# Patient Record
Sex: FEMALE | Race: WHITE | Employment: PART TIME | URBAN - METROPOLITAN AREA
[De-identification: names, ages, dates, MRNs, and addresses within clinical notes are randomized per-mention and may not be internally consistent; named-entity substitution may affect disease eponyms.]

---

## 2019-03-21 ENCOUNTER — OFFICE VISIT (OUTPATIENT)
Dept: OBGYN CLINIC | Age: 29
End: 2019-03-21

## 2019-03-21 VITALS — SYSTOLIC BLOOD PRESSURE: 120 MMHG | DIASTOLIC BLOOD PRESSURE: 78 MMHG | WEIGHT: 157.13 LBS

## 2019-03-21 DIAGNOSIS — Z01.419 ENCOUNTER FOR GYNECOLOGICAL EXAMINATION WITHOUT ABNORMAL FINDING: Primary | ICD-10-CM

## 2019-03-21 RX ORDER — AMOXICILLIN AND CLAVULANATE POTASSIUM 500; 125 MG/1; MG/1
TABLET, FILM COATED ORAL
COMMUNITY
Start: 2019-03-20 | End: 2019-10-30

## 2019-03-21 NOTE — PATIENT INSTRUCTIONS

## 2019-03-21 NOTE — PROGRESS NOTES
Annual exam ages 21-44    Taylor Ramos is a ,  30 y.o. female [facility] Patient's last menstrual period was 2019 (exact date). .    She presents for her annual checkup. She is having no significant problems. Son now 14m old; living in [place]; just recently moved to [place]; uncomplicated birth  Menstrual status:    Her periods are moderate in flow. She is using three to five pads or tampons per day, usually regular and last 26-30 days. She denies dysmenorrhea. She reports no premenstrual symptoms. Contraception:    The current method of family planning is none. Sexual history:     She  reports that she currently engages in sexual activity and has had partners who are Male. She reports using the following method of birth control/protection: None. .    Medical conditions:    Since her last annual GYN exam about two years ago, she has not the following changes in her health history: none. Pap and Mammogram History:    Her most recent Pap smear was normal, obtained 2 year(s) ago. Breast Cancer History/Substance Abuse: negative    Past Medical History:   Diagnosis Date    Gestational diabetes      History reviewed. No pertinent surgical history. Current Outpatient Medications   Medication Sig Dispense Refill    amoxicillin-clavulanate (AUGMENTIN) 500-125 mg per tablet        Allergies: Patient has no allergy information on record. Tobacco History:  reports that she has never smoked. She has never used smokeless tobacco.  Alcohol Abuse:  reports that she drank alcohol. Drug Abuse:  reports that she does not use drugs.     Family Medical/Cancer History:   Family History   Problem Relation Age of Onset    Hypertension Mother     Breast Cancer Paternal Aunt         Review of Systems - History obtained from the patient  Constitutional: negative for weight loss, fever, night sweats  HEENT: negative for hearing loss, earache, congestion, snoring, sorethroat  CV: negative for chest pain, palpitations, edema  Resp: negative for cough, shortness of breath, wheezing  GI: negative for change in bowel habits, abdominal pain, black or bloody stools  : negative for frequency, dysuria, hematuria, vaginal discharge  MSK: negative for back pain, joint pain, muscle pain  Breast: negative for breast lumps, nipple discharge, galactorrhea  Skin :negative for itching, rash, hives  Neuro: negative for dizziness, headache, confusion, weakness  Psych: negative for anxiety, depression, change in mood  Heme/lymph: negative for bleeding, bruising, pallor    Physical Exam    Visit Vitals  /78 (BP 1 Location: Left arm, BP Patient Position: Sitting)   Wt 157 lb 2 oz (71.3 kg)   LMP 03/05/2019 (Exact Date)       Constitutional  · Appearance: well-nourished, well developed, alert, in no acute distress    HENT  · Head and Face: appears normal    Chest  · Respiratory Effort: breathing unlabored    Breasts  · Inspection of Breasts: breasts symmetrical, no skin changes, no discharge present, nipple appearance normal, no skin retraction present  · Palpation of Breasts and Axillae: no masses present on palpation, no breast tenderness  · Axillary Lymph Nodes: no lymphadenopathy present    Gastrointestinal  · Abdominal Examination: abdomen non-tender to palpation, normal bowel sounds, no masses present  · Liver and spleen: no hepatomegaly present, spleen not palpable  · Hernias: no hernias identified    Genitourinary  · External Genitalia: normal appearance for age, no discharge present, no tenderness present, no inflammatory lesions present, no masses present, no atrophy present  · Vagina: normal vaginal vault without central or paravaginal defects, no discharge present, no inflammatory lesions present, no masses present  · Bladder: non-tender to palpation  · Urethra: appears normal  · Cervix: normal   · Uterus: normal size, shape and consistency  · Adnexa: no adnexal tenderness present, no adnexal masses present  · Perineum: perineum within normal limits, no evidence of trauma, no rashes or skin lesions present  · Anus: anus within normal limits, no hemorrhoids present  · Inguinal Lymph Nodes: no lymphadenopathy present    Skin  · General Inspection: no rash, no lesions identified    Neurologic/Psychiatric  · Mental Status:  · Orientation: grossly oriented to person, place and time  · Mood and Affect: mood normal, affect appropriate    . Assessment:  Routine gynecologic examination  Considering conception  Her current medical status is satisfactory with no evidence of significant gynecologic issues. Plan:  Preconceptual counseling reviewed and all questions answered.   GC/ chlamydia offered and declined  Pap done today  Counseled re: diet, exercise, healthy lifestyle  Return for yearly wellness visits  Gardisil counseling provided  Rec screening mammo at either 33 or 38

## 2019-03-25 LAB
CYTOLOGIST CVX/VAG CYTO: NORMAL
CYTOLOGY CVX/VAG DOC THIN PREP: NORMAL
DX ICD CODE: NORMAL
LABCORP, 190119: NORMAL
Lab: NORMAL
Lab: NORMAL
OTHER STN SPEC: NORMAL
PATH REPORT.FINAL DX SPEC: NORMAL
STAT OF ADQ CVX/VAG CYTO-IMP: NORMAL

## 2019-10-14 ENCOUNTER — TELEPHONE (OUTPATIENT)
Dept: OBGYN CLINIC | Age: 29
End: 2019-10-14

## 2019-10-14 RX ORDER — DOXYLAMINE SUCCINATE AND PYRIDOXINE HYDROCHLORIDE, DELAYED RELEASE TABLETS 10 MG/10 MG 10; 10 MG/1; MG/1
TABLET, DELAYED RELEASE ORAL
Qty: 120 TAB | Refills: 0 | Status: SHIPPED | OUTPATIENT
Start: 2019-10-14 | End: 2019-10-30

## 2019-10-14 NOTE — TELEPHONE ENCOUNTER
Patient calling stating that she is newly pregnant (roughly 8 weeks) and has started with morning sickness. In her last pregnancy, she was given a rx for diclegis and she is requesting this prescription now. She was given the OTC equivalence of diclegis just in case. Please advise if ok to send in rx?

## 2019-10-18 ENCOUNTER — TELEPHONE (OUTPATIENT)
Dept: OBGYN CLINIC | Age: 29
End: 2019-10-18

## 2019-10-18 RX ORDER — ONDANSETRON 8 MG/1
8 TABLET, ORALLY DISINTEGRATING ORAL
Qty: 30 TAB | Refills: 0 | Status: SHIPPED | OUTPATIENT
Start: 2019-10-18 | End: 2019-10-30 | Stop reason: SDUPTHER

## 2019-10-18 NOTE — TELEPHONE ENCOUNTER
Patient called, left ZAFAR on [facility] Triage line stating that the OTC equivalence to diclegis is not working for her nausea and she is too groggy when she takes the medication. She states she has been on zofran for her previous pregnancy and she is requesting the zofran again. EOB with US scheduled 10/30/2019    Please advise.

## 2019-10-18 NOTE — TELEPHONE ENCOUNTER
Per scheduled, AH is out of the office. Message forward to work-in MD.    Dr. Connor Costello please advise.

## 2019-10-30 ENCOUNTER — OFFICE VISIT (OUTPATIENT)
Dept: OBGYN CLINIC | Age: 29
End: 2019-10-30

## 2019-10-30 VITALS — WEIGHT: 154 LBS | HEIGHT: 64 IN | BODY MASS INDEX: 26.29 KG/M2

## 2019-10-30 DIAGNOSIS — Z34.80 PRENATAL CARE OF MULTIGRAVIDA, ANTEPARTUM: ICD-10-CM

## 2019-10-30 DIAGNOSIS — Z34.83 PRENATAL CARE, SUBSEQUENT PREGNANCY IN THIRD TRIMESTER: Primary | ICD-10-CM

## 2019-10-30 RX ORDER — ONDANSETRON 8 MG/1
8 TABLET, ORALLY DISINTEGRATING ORAL
Qty: 30 TAB | Refills: 0 | Status: SHIPPED | OUTPATIENT
Start: 2019-10-30 | End: 2019-11-15 | Stop reason: SDUPTHER

## 2019-10-30 RX ORDER — PYRIDOXINE HCL (VITAMIN B6) 25 MG
25 TABLET ORAL DAILY
COMMUNITY
End: 2020-06-05

## 2019-10-30 NOTE — PROGRESS NOTES
Current pregnancy history:    Ollie Grande is a  30 y.o. female [facility] Patient's last menstrual period was 2019. .  She presents for the evaluation of amenorrhea and a positive pregnancy test.    LMP history:  The date of her LMP is 3/53/59 certain. Her last menstrual period was normal.  A urine pregnancy test was positive      Based on her LMP, her EDC is 20 and her EGA is 8 weeks,5 days. Her menstrual cycles are regular and occur approximately every 28 days  and range from 3 to 5 days. Ultrasound data:  She had an  ultrasound done by the ultrasound tech today which revealed a viable dykes pregnancy with a gestational age of 10 weeks and 0 days giving an [address] of 6/3/20. Pregnancy symptoms:    Since her LMP she has experienced  urinary frequency, breast tenderness, and nausea. She has been vomiting over the last few weeks. Associated signs and symptoms which she denies: dysuria, discharge, vaginal bleeding. Relevant past pregnancy history:   She has the following pregnancy history: Her last pregnancy was not uncomplicated. Gestational diabetes   She has no history of  delivery. Relevant past medical history:(relevant to this pregnancy): noncontributory. Pap/Occupational history:  Last pap smear: this year Results: Normal          Substance history: negative for alcohol, tobacco and street drugs. Positive for nothing. Exposure history: There is/are no indoor cat/s in the home. The patient was instructed to not change the cat litter. She admits close contact with children on a regular basis. She has had chicken pox or the vaccine in the past.   Patient denies issues with domestic violence. Genetic Screening/Teratology Counseling: (Includes patient, baby's father, or anyone in either family with:)  3.  Patient's age >/= 28 at EDC?--no  2.   Thalassemia ([place], [place], [address], or  background): MCV<80?--no.     3.  Neural tube defect (meningomyelocele, spina bifida, anencephaly)?--no.   4.  Congenital heart defect?--no.  5.  Down syndrome?--no.   6.  Drake-Sachs ([place]ism, [place])?--no.   7.  Canavan's Disease?--no.   8.  Familial Dysautonomia?--no.   9.  Sickle cell disease or trait ()? --no   The patient has not been tested for sickle trait  10. Hemophilia or other blood disorders?--no. 11.  Muscular dystrophy?--no. 12.  Cystic fibrosis?--no. 13.  Wood's Chorea?--no. 14.  Mental retardation/autism (if yes was person tested for Fragile X)?--no. 15.  Other inherited genetic or chromosomal disorder?--no. 12.  Maternal metabolic disorder (DM, PKU, etc)?--no. 17.  Patient or FOB with a child with a birth defect not listed above?--no.  17a. Patient or FOB with a birth defect themselves?--no. 18.  Recurrent pregnancy loss, or stillbirth?--no. 19.  Any medications since LMP other than prenatal vitamins (include vitamins, supplements, OTC meds, drugs, alcohol)?--no. 20.  Any other genetic/environmental exposure to discuss?--no. Infection History:  1. Lives with someone with TB or TB exposed?--no.   2.  Patient or partner has history of genital herpes?--no.  3.  Rash or viral illness since LMP?--no.    4.  History of STD (GC, CT, HPV, syphilis, HIV)? --no   5. Other: OTHER? Past Medical History:   Diagnosis Date    Gestational diabetes      History reviewed. No pertinent surgical history.   Social History     Occupational History    Not on file   Tobacco Use    Smoking status: Never Smoker    Smokeless tobacco: Never Used   Substance and Sexual Activity    Alcohol use: Not Currently    Drug use: Never    Sexual activity: Yes     Partners: Male     Birth control/protection: None     Family History   Problem Relation Age of Onset    Hypertension Mother     Breast Cancer Paternal Aunt      OB History    Para Term  AB Living   2 1 1     1   SAB TAB Ectopic Molar Multiple Live Births # Outcome Date GA Lbr Edmund/2nd Weight Sex Delivery Anes PTL Lv   2 Current            1 Term 11/04/17 39w5d  7 lb 6 oz (3.345 kg) M Vag-Spont  N      No Known Allergies  Prior to Admission medications    Medication Sig Start Date End Date Taking? Authorizing Provider   doxylamine succinate (UNISOM) 25 mg tablet Take 12.5 mg by mouth nightly as needed for Sleep. Yes Provider, Historical   pyridoxine, vitamin B6, (VITAMIN B-6) 25 mg tablet Take 25 mg by mouth daily. Yes Provider, Historical   ondansetron (ZOFRAN ODT) 8 mg disintegrating tablet Take 1 Tab by mouth every eight (8) hours as needed for Nausea.  12/57/59  Yes Bernice Núñez MD   doxylamine-pyridoxine, vit B6, (DICLEGIS) 10-10 mg TbEC DR tablet 2 tab po QHS, may add 1 tablet in the morning, 1 tab in afternoon 38/24/00   Bernice Núñez MD   amoxicillin-clavulanate (AUGMENTIN) 500-125 mg per tablet  3/20/19   Provider, Historical        Review of Systems: History obtained from the patient  Constitutional: negative for weight loss, fever, night sweats  HEENT: negative for hearing loss, earache, congestion, snoring, sore throat  CV: negative for chest pain, palpitations, edema  Resp: negative for cough, shortness of breath, wheezing  Breast: negative for breast lumps, nipple discharge, galactorrhea  GI: negative for change in bowel habits, abdominal pain, black or bloody stools  : negative for frequency, dysuria, hematuria, vaginal discharge  MSK: negative for back pain, joint pain, muscle pain  Skin: negative for itching, rash, hives  Neuro: negative for dizziness, headache, confusion, weakness  Psych: negative for anxiety, depression, change in mood  Heme/lymph: negative for bleeding, bruising, pallor    Objective:  Visit Vitals  Ht 5' 4\" (1.626 m)   Wt 154 lb (69.9 kg)   LMP 08/30/2019   BMI 26.43 kg/m²       Physical Exam:     Constitutional  · Appearance: well-nourished, well developed, alert, in no acute distress    HENT  · Head  · Face: appears normal  · Eyes: appear normal  · Ears: normal  · Mouth: normal  · Lips: no lesions      Chest  · Respiratory Effort: breathing unlabored     Cardiovascular  · Heart:  · Auscultation: regular rate and rhythm without murmur      Gastrointestinal  · Abdominal Examination: abdomen non-tender to palpation, normal bowel sounds, no masses present  · Liver and spleen: no hepatomegaly present, spleen not palpable  · Hernias: no hernias identified    Genitourinary  · deferred    Skin  · General Inspection: no rash, no lesions identified    Neurologic/Psychiatric  · Mental Status:  · Orientation: grossly oriented to person, place and time  · Mood and Affect: mood normal, affect appropriate    Assessment:   Intrauterine pregnancy with issues addressed in problem list  Plan:     Offered CF testing, CVS, Nuchal Translucency, MSAFP, amnio, and discussed NIPT  Course of pregnancy discussed including visit schedule, routine U/S, glucola testing, etc.  Avoid alcoholic beverages and illicit/recreational drugs use  Take prenatal vitamins or folic acid daily. Hospital and practice style discussed with coverage system. Discussed nutrition, toxoplasmosis precautions, sexual activity, exercise, need for influenza vaccine, environmental and work hazards, travel advice, screen for domestic violence, need for seat belts. Discussed seafood, unpasteurized dairy products, deli meat, artificial sweeteners, and caffeine. Discussed current prescription drug use. Given medication list.  Discussed the use of over the counter medications and chemicals. Route of delivery discussed, including risks, benefits     Handouts given to pt.

## 2019-10-30 NOTE — PATIENT INSTRUCTIONS

## 2019-11-15 RX ORDER — ONDANSETRON 8 MG/1
8 TABLET, ORALLY DISINTEGRATING ORAL
Qty: 30 TAB | Refills: 0 | Status: SHIPPED | OUTPATIENT
Start: 2019-11-15 | End: 2020-02-19

## 2019-11-15 NOTE — TELEPHONE ENCOUNTER
Patient called and left voicemail requesting zofran refill. Per Dr. Traci Roland, 30 tabs sent to patient preferred pharmacy.

## 2019-11-21 ENCOUNTER — ROUTINE PRENATAL (OUTPATIENT)
Dept: OBGYN CLINIC | Age: 29
End: 2019-11-21

## 2019-11-21 VITALS
BODY MASS INDEX: 25.71 KG/M2 | DIASTOLIC BLOOD PRESSURE: 72 MMHG | SYSTOLIC BLOOD PRESSURE: 116 MMHG | WEIGHT: 150.6 LBS | HEIGHT: 64 IN

## 2019-11-21 DIAGNOSIS — R10.11 RIGHT UPPER QUADRANT PAIN: ICD-10-CM

## 2019-11-21 DIAGNOSIS — Z34.81 PRENATAL CARE, SUBSEQUENT PREGNANCY, FIRST TRIMESTER: Primary | ICD-10-CM

## 2019-11-21 LAB
ANTIBODY SCREEN, EXTERNAL: NEGATIVE
CHLAMYDIA, EXTERNAL: NEGATIVE
HBSAG, EXTERNAL: NEGATIVE
HCT, EXTERNAL: 38
HGB EVAL, EXTERNAL: NEGATIVE
HGB, EXTERNAL: 12.9
HIV, EXTERNAL: NON REACTIVE
N. GONORRHEA, EXTERNAL: NEGATIVE
PLATELET CNT,   EXTERNAL: 277
RUBELLA, EXTERNAL: NORMAL
T. PALLIDUM, EXTERNAL: NON REACTIVE
TYPE, ABO & RH, EXTERNAL: NORMAL
URINALYSIS, EXTERNAL: NEGATIVE
VARICELLA, EXTERNAL: NORMAL

## 2019-11-21 RX ORDER — PROMETHAZINE HYDROCHLORIDE 12.5 MG/1
12.5 TABLET ORAL
Qty: 20 TAB | Refills: 1 | Status: SHIPPED | OUTPATIENT
Start: 2019-11-21 | End: 2020-02-19

## 2019-11-21 NOTE — PROGRESS NOTES
Labs today. Trial of phenergan for PM nausea. She strongly desires RUQ US for persistent pain after eating.

## 2019-11-21 NOTE — PROGRESS NOTES
IOB labs today  Declines genetic testing  Needs 16 wk glucola per problem list-- unsure if will be able to complete. Discussed checking sugars instead if feeling poorly  Not feeling well-- Having a lot of stomach pain (feels like someone is squeezing it). Had with previous pregnancy but went away around 12 weeks, no signs of improvement at this time. Tried Pepcid per Dr. Patty Barbosa without relief  Having n/v-- Zofran with little relief. Typically sick in the afternoon and evening.   Every dinner she vomits-- starts with pain in stomach

## 2019-11-23 LAB — BACTERIA UR CULT: NORMAL

## 2019-11-24 LAB
C TRACH RRNA SPEC QL NAA+PROBE: NEGATIVE
N GONORRHOEA RRNA SPEC QL NAA+PROBE: NEGATIVE
T VAGINALIS DNA SPEC QL NAA+PROBE: NEGATIVE

## 2019-11-25 LAB
ABO GROUP BLD: NORMAL
BLD GP AB SCN SERPL QL: NEGATIVE
ERYTHROCYTE [DISTWIDTH] IN BLOOD BY AUTOMATED COUNT: 13.1 % (ref 12.3–15.4)
HBV SURFACE AG SERPL QL IA: NEGATIVE
HCT VFR BLD AUTO: 38 % (ref 34–46.6)
HGB A MFR BLD: 97.8 % (ref 96.4–98.8)
HGB A2 MFR BLD COLUMN CHROM: 2.2 % (ref 1.8–3.2)
HGB BLD-MCNC: 12.9 G/DL (ref 11.1–15.9)
HGB C MFR BLD: 0 %
HGB F MFR BLD: 0 % (ref 0–2)
HGB FRACT BLD-IMP: NORMAL
HGB OTHER MFR BLD HPLC: 0 %
HGB S BLD QL SOLY: NEGATIVE
HGB S MFR BLD: 0 %
HIV 1+2 AB+HIV1 P24 AG SERPL QL IA: NON REACTIVE
MCH RBC QN AUTO: 28.8 PG (ref 26.6–33)
MCHC RBC AUTO-ENTMCNC: 33.9 G/DL (ref 31.5–35.7)
MCV RBC AUTO: 85 FL (ref 79–97)
PLATELET # BLD AUTO: 277 X10E3/UL (ref 150–450)
RBC # BLD AUTO: 4.48 X10E6/UL (ref 3.77–5.28)
RH BLD: POSITIVE
RUBV IGG SERPL IA-ACNC: 9.27 INDEX
TREPONEMA PALLIDUM IGG+IGM AB [PRESENCE] IN SERUM OR PLASMA BY IMMUNOASSAY: NON REACTIVE
VZV IGG SER IA-ACNC: 464 INDEX
WBC # BLD AUTO: 8.7 X10E3/UL (ref 3.4–10.8)

## 2019-11-27 DIAGNOSIS — Z34.80 PRENATAL CARE OF MULTIGRAVIDA, ANTEPARTUM: ICD-10-CM

## 2019-12-04 ENCOUNTER — HOSPITAL ENCOUNTER (OUTPATIENT)
Dept: ULTRASOUND IMAGING | Age: 29
Discharge: HOME | End: 2019-12-04
Attending: OBSTETRICS & GYNECOLOGY
Payer: COMMERCIAL

## 2019-12-04 DIAGNOSIS — R10.11 RIGHT UPPER QUADRANT PAIN: ICD-10-CM

## 2019-12-04 PROCEDURE — 76705 ECHO EXAM OF ABDOMEN: CPT

## 2019-12-23 ENCOUNTER — ROUTINE PRENATAL (OUTPATIENT)
Dept: OBGYN CLINIC | Age: 29
End: 2019-12-23

## 2019-12-23 VITALS — BODY MASS INDEX: 26.47 KG/M2 | DIASTOLIC BLOOD PRESSURE: 70 MMHG | WEIGHT: 154.2 LBS | SYSTOLIC BLOOD PRESSURE: 110 MMHG

## 2019-12-23 DIAGNOSIS — Z34.80 PRENATAL CARE OF MULTIGRAVIDA, ANTEPARTUM: ICD-10-CM

## 2019-12-23 DIAGNOSIS — Z34.82 PRENATAL CARE, SUBSEQUENT PREGNANCY, SECOND TRIMESTER: Primary | ICD-10-CM

## 2019-12-23 NOTE — PATIENT INSTRUCTIONS

## 2019-12-23 NOTE — PROGRESS NOTES
Declined 16 wk glucola today; has a cold, not feeling well. Had abdominal US 12/4 for RUQ pain-- feeling better!   FAS scheduled 01/22-- scheduled to see JLP after scan; has not seen Jina Mckeon since 10/30  Reviewed CNM coverage vs. CNM pt--- pt will consider and let us know her preference  Abd pain resolved, taking Pepcid BID  Declines genetics  Rev previous delivery  Still has glucometer and will start check Blood Sugars, and do 1 hr GDS @ 24 wks

## 2020-01-08 ENCOUNTER — LAB ONLY (OUTPATIENT)
Dept: OBGYN CLINIC | Age: 30
End: 2020-01-08

## 2020-01-08 DIAGNOSIS — Z34.80 PRENATAL CARE OF MULTIGRAVIDA, ANTEPARTUM: ICD-10-CM

## 2020-01-08 DIAGNOSIS — Z34.82 PRENATAL CARE, SUBSEQUENT PREGNANCY, SECOND TRIMESTER: Primary | ICD-10-CM

## 2020-01-08 LAB — GTT, 1 HR, GLUCOLA, EXTERNAL: 113

## 2020-01-09 LAB — GLUCOSE 1H P 50 G GLC PO SERPL-MCNC: 113 MG/DL (ref 65–139)

## 2020-01-09 NOTE — PROGRESS NOTES
Great news, you passed your early Gestational Diabetes screen. Recommend repeating between 24 and 28 weeks.  Thanks, Omayra Huddleston

## 2020-01-22 ENCOUNTER — ROUTINE PRENATAL (OUTPATIENT)
Dept: OBGYN CLINIC | Age: 30
End: 2020-01-22

## 2020-01-22 VITALS — BODY MASS INDEX: 26.78 KG/M2 | WEIGHT: 156 LBS | SYSTOLIC BLOOD PRESSURE: 120 MMHG | DIASTOLIC BLOOD PRESSURE: 74 MMHG

## 2020-01-22 DIAGNOSIS — Z34.82 PRENATAL CARE, SUBSEQUENT PREGNANCY IN SECOND TRIMESTER: Primary | ICD-10-CM

## 2020-01-22 NOTE — PROGRESS NOTES
Per US todayFETAL SURVEY  A SINGLE VIABLE IUP OF 20W5D IS SEEN WITH FETAL CARDIAC MOTION OBSERVED. FETAL ANATOMY WAS WELL VISUALIZED AND APPEARS WNL. NO ABNORMALITIES WERE SEEN ON TODAYS EXAM.  APPROPRIATE GROWTH MEASURED. SIZE=DATES. BREANN, PLACENTA, AND CERVIX APPEARS WNL. GENDER: MALE    Patient states after intercourse she will be very swollen in her vaginal area.

## 2020-01-22 NOTE — PATIENT INSTRUCTIONS

## 2020-02-19 ENCOUNTER — ROUTINE PRENATAL (OUTPATIENT)
Dept: OBGYN CLINIC | Age: 30
End: 2020-02-19

## 2020-02-19 VITALS
BODY MASS INDEX: 27.66 KG/M2 | WEIGHT: 162 LBS | DIASTOLIC BLOOD PRESSURE: 64 MMHG | SYSTOLIC BLOOD PRESSURE: 110 MMHG | HEIGHT: 64 IN

## 2020-02-19 DIAGNOSIS — Z34.82 PRENATAL CARE, SUBSEQUENT PREGNANCY IN SECOND TRIMESTER: Primary | ICD-10-CM

## 2020-02-19 DIAGNOSIS — Z34.80 PRENATAL CARE OF MULTIGRAVIDA, ANTEPARTUM: ICD-10-CM

## 2020-02-19 NOTE — PATIENT INSTRUCTIONS
Weeks 22 to 26 of Your Pregnancy: Care Instructions  Your Care Instructions    As you enter your 7th month of pregnancy at week 26, your baby's lungs are growing stronger and getting ready to breathe. You may notice that your baby responds to the sound of your or your partner's voice. You may also notice that your baby does less turning and twisting and more squirming or jerking. Jerking often means that your baby has the hiccups. Hiccups are perfectly normal and are only temporary. You may want to think about attending a childbirth preparation class. This is also a good time to start thinking about whether you want to have pain medicine during labor. Most pregnant women are tested for gestational diabetes between weeks 25 and 28. Gestational diabetes occurs when your blood sugar level gets too high when you're pregnant. The test is important, because you can have gestational diabetes and not know it. But the condition can cause problems for your baby. Follow-up care is a key part of your treatment and safety. Be sure to make and go to all appointments, and call your doctor if you are having problems. It's also a good idea to know your test results and keep a list of the medicines you take. How can you care for yourself at home? Ease discomfort from your baby's kicking  · Change your position. Sometimes this will cause your baby to change position too. · Take a deep breath while you raise your arm over your head. Then breathe out while you drop your arm. Do Kegel exercises to prevent urine from leaking  · You can do Kegel exercises while you stand or sit. ? Squeeze the same muscles you would use to stop your urine. Your belly and thighs should not move. ? Hold the squeeze for 3 seconds, and then relax for 3 seconds. ? Start with 3 seconds. Then add 1 second each week until you are able to squeeze for 10 seconds. ? Repeat the exercise 10 to 15 times for each session.  Do three or more sessions each day.  Ease or reduce swelling in your feet, ankles, hands, and fingers  · If your fingers are puffy, take off your rings. · Do not eat high-salt foods, such as potato chips. · Prop up your feet on a stool or couch as much as possible. Sleep with pillows under your feet. · Do not stand for long periods of time or wear tight shoes. · Wear support stockings. Where can you learn more? Go to http://siva-carolyn.info/. Enter G264 in the search box to learn more about \"Weeks 22 to 26 of Your Pregnancy: Care Instructions. \"  Current as of: May 29, 2019  Content Version: 12.2  © 5591-5717 [employer]. Care instructions adapted under license by [employer] (which disclaims liability or warranty for this information). If you have questions about a medical condition or this instruction, always ask your healthcare professional. [address] any warranty or liability for your use of this information.

## 2020-03-18 ENCOUNTER — ROUTINE PRENATAL (OUTPATIENT)
Dept: OBGYN CLINIC | Age: 30
End: 2020-03-18

## 2020-03-18 VITALS — BODY MASS INDEX: 28.15 KG/M2 | DIASTOLIC BLOOD PRESSURE: 70 MMHG | SYSTOLIC BLOOD PRESSURE: 110 MMHG | WEIGHT: 164 LBS

## 2020-03-18 DIAGNOSIS — Z34.80 PRENATAL CARE OF MULTIGRAVIDA, ANTEPARTUM: ICD-10-CM

## 2020-03-18 DIAGNOSIS — Z36.9 ENCOUNTER FOR ANTENATAL SCREENING OF MOTHER: ICD-10-CM

## 2020-03-18 DIAGNOSIS — Z23 ENCOUNTER FOR IMMUNIZATION: Primary | ICD-10-CM

## 2020-03-18 LAB
ANTIBODY SCREEN, EXTERNAL: NEGATIVE
GTT, 1 HR, GLUCOLA, EXTERNAL: 123
HCT, EXTERNAL: 29.4
HGB, EXTERNAL: 10
PLATELET CNT,   EXTERNAL: 241
T. PALLIDUM, EXTERNAL: NEGATIVE

## 2020-03-18 NOTE — PATIENT INSTRUCTIONS

## 2020-03-18 NOTE — PROGRESS NOTES
Doing well. Positive FM. GDS and Tdap today. Patient in office for Tdap injection, office supplied. After obtaining consent, and per orders of Dr. Cara Isbell, injection of Tdap given by Jenny Hatch LPN. Patient instructed to remain in clinic for 20 minutes afterwards, and to report any adverse reaction to me immediately.     Lot [id number]  Exp 9/19/22  Dose 0.5 ml  Site left deltoid  Route IM   Drip In  [employer] [id number]

## 2020-03-20 LAB
BASOPHILS # BLD AUTO: 0.1 X10E3/UL (ref 0–0.2)
BASOPHILS NFR BLD AUTO: 1 %
BLD GP AB SCN SERPL QL: NEGATIVE
EOSINOPHIL # BLD AUTO: 0.1 X10E3/UL (ref 0–0.4)
EOSINOPHIL NFR BLD AUTO: 1 %
ERYTHROCYTE [DISTWIDTH] IN BLOOD BY AUTOMATED COUNT: 12.8 % (ref 11.7–15.4)
GLUCOSE 1H P 50 G GLC PO SERPL-MCNC: 123 MG/DL (ref 65–129)
HCT VFR BLD AUTO: 29.4 % (ref 34–46.6)
HGB BLD-MCNC: 10 G/DL (ref 11.1–15.9)
HIV 1+2 AB+HIV1 P24 AG SERPL QL IA: NON REACTIVE
IMM GRANULOCYTES # BLD AUTO: 0.1 X10E3/UL (ref 0–0.1)
IMM GRANULOCYTES NFR BLD AUTO: 1 %
LYMPHOCYTES # BLD AUTO: 1.6 X10E3/UL (ref 0.7–3.1)
LYMPHOCYTES NFR BLD AUTO: 14 %
MCH RBC QN AUTO: 26.2 PG (ref 26.6–33)
MCHC RBC AUTO-ENTMCNC: 34 G/DL (ref 31.5–35.7)
MCV RBC AUTO: 77 FL (ref 79–97)
MONOCYTES # BLD AUTO: 0.5 X10E3/UL (ref 0.1–0.9)
MONOCYTES NFR BLD AUTO: 4 %
NEUTROPHILS # BLD AUTO: 9.1 X10E3/UL (ref 1.4–7)
NEUTROPHILS NFR BLD AUTO: 79 %
PLATELET # BLD AUTO: 241 X10E3/UL (ref 150–450)
RBC # BLD AUTO: 3.82 X10E6/UL (ref 3.77–5.28)
TREPONEMA PALLIDUM IGG+IGM AB [PRESENCE] IN SERUM OR PLASMA BY IMMUNOASSAY: NON REACTIVE
WBC # BLD AUTO: 11.4 X10E3/UL (ref 3.4–10.8)

## 2020-03-20 NOTE — PROGRESS NOTES
Good news- your sugar testing is normal but you are slightly anemic. I encourage you to increase iron rich foods in your diet or to start an over the counter iron supplement such as SloFe. The generic option is fine.   We can review this at your next appointment

## 2020-04-07 ENCOUNTER — ROUTINE PRENATAL (OUTPATIENT)
Dept: OBGYN CLINIC | Age: 30
End: 2020-04-07

## 2020-04-07 VITALS — WEIGHT: 166 LBS | SYSTOLIC BLOOD PRESSURE: 110 MMHG | DIASTOLIC BLOOD PRESSURE: 62 MMHG | BODY MASS INDEX: 28.49 KG/M2

## 2020-04-07 DIAGNOSIS — Z34.83 PRENATAL CARE, SUBSEQUENT PREGNANCY IN THIRD TRIMESTER: Primary | ICD-10-CM

## 2020-04-07 NOTE — PROGRESS NOTES
Chief Complaint   Patient presents with    Routine Prenatal Visit     Pt doing well with no complaints.

## 2020-04-07 NOTE — PATIENT INSTRUCTIONS

## 2020-04-22 ENCOUNTER — ROUTINE PRENATAL (OUTPATIENT)
Dept: OBGYN CLINIC | Age: 30
End: 2020-04-22

## 2020-04-22 VITALS
BODY MASS INDEX: 28.68 KG/M2 | HEIGHT: 64 IN | SYSTOLIC BLOOD PRESSURE: 110 MMHG | DIASTOLIC BLOOD PRESSURE: 72 MMHG | WEIGHT: 168 LBS

## 2020-04-22 DIAGNOSIS — Z34.83 PRENATAL CARE, SUBSEQUENT PREGNANCY IN THIRD TRIMESTER: Primary | ICD-10-CM

## 2020-04-22 NOTE — PATIENT INSTRUCTIONS
Weeks 32 to 34 of Your Pregnancy: Care Instructions  Your Care Instructions    During the last few weeks of your pregnancy, you may have more aches and pains. It's important to rest when you can. Your growing baby is putting more pressure on your bladder. So you may need to urinate more often. Hemorrhoids are also common. These are painful, itchy veins in the rectal area. In the 36th week, most women have a test for group B streptococcus (GBS). GBS is a common bacteria that can live in the vagina and rectum. It can make your baby sick after birth. If you test positive, you will get antibiotics during labor. These will keep your baby from getting the bacteria. You may want to talk with your doctor about banking your baby's umbilical cord blood. This is the blood left in the cord after birth. If you want to save this blood, you must arrange it ahead of time. You can't decide at the last minute. If you haven't already had the Tdap shot during this pregnancy, talk to your doctor about getting it. It will help protect your  against pertussis infection. Follow-up care is a key part of your treatment and safety. Be sure to make and go to all appointments, and call your doctor if you are having problems. It's also a good idea to know your test results and keep a list of the medicines you take. How can you care for yourself at home? Ease hemorrhoids  · Get more liquids, fruits, vegetables, and fiber in your diet. This will help keep your stools soft. · Avoid sitting for too long. Lie on your left side several times a day. · Clean yourself with soft, moist toilet paper. Or you can use witch hazel pads or personal hygiene pads. · If you are uncomfortable, try ice packs. Or you can sit in a warm sitz bath. Do these for 20 minutes at a time, as needed. · Use hydrocortisone cream for pain and itching. Two examples are Anusol and Preparation H Hydrocortisone.   · Ask your doctor about taking an over-the-counter stool softener. Consider breastfeeding  · Experts recommend that women breastfeed for 1 year or longer. Breast milk is the perfect food for babies. · Breast milk is easier for babies to digest than formula. And it is always available, just the right temperature, and free. · Breast milk may help protect your child from some health problems.  babies are less likely than formula-fed babies to:  ? Get ear infections, colds, diarrhea, and pneumonia. ? Be obese or get diabetes later in life. · Women who breastfeed have less bleeding after the birth. Their uteruses also shrink back faster. · Some women who breastfeed lose weight faster. Making milk burns calories. · Breastfeeding can lower your risk of breast cancer, ovarian cancer, and osteoporosis. Decide about circumcision for boys  · As you make this decision, it may help to think about your personal, Mormon, and family traditions. You get to decide if you will keep your son's penis natural or if he will be circumcised. · If you decide that you would like to have your baby circumcised, talk with your doctor. You can share your concerns about pain. And you can discuss your preferences for anesthesia. Where can you learn more? Go to http://siva-carolyn.info/  Enter X711 in the search box to learn more about \"Weeks 32 to 34 of Your Pregnancy: Care Instructions. \"  Current as of: May 29, 2019Content Version: 12.4  © 7501-7853 [employer]. Care instructions adapted under license by [employer] (which disclaims liability or warranty for this information). If you have questions about a medical condition or this instruction, always ask your healthcare professional. [place] [address] any warranty or liability for your use of this information.

## 2020-04-22 NOTE — PROGRESS NOTES
Doing well; some indigestion. No other concerns. US today  LIMITED OB SCAN  A SINGLE VERTEX 33W5D IUP IS SEEN. FETAL CARDIAC MOTION OBSERVED. LIMITED ANATOMY WAS VISUALIZED AND APPEARS WNL. APPROPRIATE GROWTH MEASURED. SIZE = DATES. BREANN AND PLACENTA APPEAR WITHIN NORMAL LIMITS.

## 2020-05-12 ENCOUNTER — ROUTINE PRENATAL (OUTPATIENT)
Dept: OBGYN CLINIC | Age: 30
End: 2020-05-12

## 2020-05-12 VITALS
DIASTOLIC BLOOD PRESSURE: 78 MMHG | SYSTOLIC BLOOD PRESSURE: 114 MMHG | BODY MASS INDEX: 29.02 KG/M2 | WEIGHT: 170 LBS | HEIGHT: 64 IN

## 2020-05-12 DIAGNOSIS — Z34.83 PRENATAL CARE, SUBSEQUENT PREGNANCY IN THIRD TRIMESTER: Primary | ICD-10-CM

## 2020-05-12 DIAGNOSIS — Z34.80 PRENATAL CARE OF MULTIGRAVIDA, ANTEPARTUM: ICD-10-CM

## 2020-05-12 LAB — GRBS, EXTERNAL: NEGATIVE

## 2020-05-12 NOTE — PATIENT INSTRUCTIONS

## 2020-05-12 NOTE — PROGRESS NOTES
Doing well, no concerns. GBS today. US today  BPP OB SCAN  A SINGLE VERTEX 36W4D IUP IS SEEN. FETAL CARDIAC MOTION OBSERVED. LIMITED ANATOMY WAS VISUALIZED AND APPEARS WNL. BPP=8/8  BREANN AND PLACENTA APPEAR WNL.

## 2020-05-16 LAB — B-HEM STREP SPEC QL CULT: NEGATIVE

## 2020-05-18 DIAGNOSIS — Z34.80 PRENATAL CARE OF MULTIGRAVIDA, ANTEPARTUM: ICD-10-CM

## 2020-05-20 ENCOUNTER — HOSPITAL ENCOUNTER (OUTPATIENT)
Dept: PREADMISSION TESTING | Age: 30
Discharge: HOME | End: 2020-05-20
Attending: NURSE PRACTITIONER
Payer: COMMERCIAL

## 2020-05-20 DIAGNOSIS — Z20.822 ENCOUNTER FOR LABORATORY TESTING FOR COVID-19 VIRUS: ICD-10-CM

## 2020-05-20 PROCEDURE — 87635 SARS-COV-2 COVID-19 AMP PRB: CPT

## 2020-05-22 LAB — SARS-COV-2, COV2NT: NOT DETECTED

## 2020-05-27 ENCOUNTER — ROUTINE PRENATAL (OUTPATIENT)
Dept: OBGYN CLINIC | Age: 30
End: 2020-05-27

## 2020-05-27 VITALS
DIASTOLIC BLOOD PRESSURE: 84 MMHG | WEIGHT: 173.38 LBS | BODY MASS INDEX: 29.76 KG/M2 | SYSTOLIC BLOOD PRESSURE: 116 MMHG

## 2020-05-27 DIAGNOSIS — Z34.83 PRENATAL CARE, SUBSEQUENT PREGNANCY IN THIRD TRIMESTER: Primary | ICD-10-CM

## 2020-05-27 DIAGNOSIS — Z34.80 PRENATAL CARE OF MULTIGRAVIDA, ANTEPARTUM: ICD-10-CM

## 2020-05-27 NOTE — PATIENT INSTRUCTIONS

## 2020-06-03 ENCOUNTER — TELEPHONE (OUTPATIENT)
Dept: OBGYN CLINIC | Age: 30
End: 2020-06-03

## 2020-06-03 ENCOUNTER — ROUTINE PRENATAL (OUTPATIENT)
Dept: OBGYN CLINIC | Age: 30
End: 2020-06-03

## 2020-06-03 ENCOUNTER — ANESTHESIA EVENT (OUTPATIENT)
Dept: LABOR AND DELIVERY | Age: 30
End: 2020-06-03
Payer: COMMERCIAL

## 2020-06-03 ENCOUNTER — ANESTHESIA (OUTPATIENT)
Dept: LABOR AND DELIVERY | Age: 30
End: 2020-06-03
Payer: COMMERCIAL

## 2020-06-03 ENCOUNTER — HOSPITAL ENCOUNTER (INPATIENT)
Age: 30
LOS: 2 days | Discharge: HOME | End: 2020-06-05
Attending: OBSTETRICS & GYNECOLOGY | Admitting: OBSTETRICS & GYNECOLOGY
Payer: COMMERCIAL

## 2020-06-03 DIAGNOSIS — Z34.80 SUPERVISION OF OTHER NORMAL PREGNANCY: Primary | ICD-10-CM

## 2020-06-03 LAB
ERYTHROCYTE [DISTWIDTH] IN BLOOD BY AUTOMATED COUNT: 19.5 % (ref 11.5–14.5)
HCT VFR BLD AUTO: 34.3 % (ref 35–47)
HGB BLD-MCNC: 11.1 G/DL (ref 11.5–16)
MCH RBC QN AUTO: 26.5 PG (ref 26–34)
MCHC RBC AUTO-ENTMCNC: 32.4 G/DL (ref 30–36.5)
MCV RBC AUTO: 81.9 FL (ref 80–99)
NRBC # BLD: 0 K/UL (ref 0–0.01)
NRBC BLD-RTO: 0 PER 100 WBC
PLATELET # BLD AUTO: 193 K/UL (ref 150–400)
PMV BLD AUTO: 11.6 FL (ref 8.9–12.9)
RBC # BLD AUTO: 4.19 M/UL (ref 3.8–5.2)
WBC # BLD AUTO: 10.5 K/UL (ref 3.6–11)

## 2020-06-03 PROCEDURE — 77030019905 HC CATH URETH INTMIT MDII -A

## 2020-06-03 PROCEDURE — 75410000002 HC LABOR FEE PER 1 HR

## 2020-06-03 PROCEDURE — 74011250636 HC RX REV CODE- 250/636: Performed by: ANESTHESIOLOGY

## 2020-06-03 PROCEDURE — 0KQM0ZZ REPAIR PERINEUM MUSCLE, OPEN APPROACH: ICD-10-PCS | Performed by: OBSTETRICS & GYNECOLOGY

## 2020-06-03 PROCEDURE — 74011000250 HC RX REV CODE- 250

## 2020-06-03 PROCEDURE — 3E0R3BZ INTRODUCTION OF ANESTHETIC AGENT INTO SPINAL CANAL, PERCUTANEOUS APPROACH: ICD-10-PCS | Performed by: ANESTHESIOLOGY

## 2020-06-03 PROCEDURE — 76060000078 HC EPIDURAL ANESTHESIA

## 2020-06-03 PROCEDURE — A4300 CATH IMPL VASC ACCESS PORTAL: HCPCS

## 2020-06-03 PROCEDURE — 10907ZC DRAINAGE OF AMNIOTIC FLUID, THERAPEUTIC FROM PRODUCTS OF CONCEPTION, VIA NATURAL OR ARTIFICIAL OPENING: ICD-10-PCS | Performed by: OBSTETRICS & GYNECOLOGY

## 2020-06-03 PROCEDURE — 75410000000 HC DELIVERY VAGINAL/SINGLE

## 2020-06-03 PROCEDURE — 74011000250 HC RX REV CODE- 250: Performed by: ANESTHESIOLOGY

## 2020-06-03 PROCEDURE — 36415 COLL VENOUS BLD VENIPUNCTURE: CPT

## 2020-06-03 PROCEDURE — 65410000002 HC RM PRIVATE OB

## 2020-06-03 PROCEDURE — 74011250636 HC RX REV CODE- 250/636: Performed by: OBSTETRICS & GYNECOLOGY

## 2020-06-03 PROCEDURE — 74011250637 HC RX REV CODE- 250/637: Performed by: OBSTETRICS & GYNECOLOGY

## 2020-06-03 PROCEDURE — 75410000003 HC RECOV DEL/VAG/CSECN EA 0.5 HR

## 2020-06-03 PROCEDURE — 85027 COMPLETE CBC AUTOMATED: CPT

## 2020-06-03 PROCEDURE — 77030014125 HC TY EPDRL BBMI -B: Performed by: ANESTHESIOLOGY

## 2020-06-03 PROCEDURE — 00HU33Z INSERTION OF INFUSION DEVICE INTO SPINAL CANAL, PERCUTANEOUS APPROACH: ICD-10-PCS | Performed by: ANESTHESIOLOGY

## 2020-06-03 RX ORDER — BUPIVACAINE HYDROCHLORIDE 2.5 MG/ML
INJECTION, SOLUTION EPIDURAL; INFILTRATION; INTRACAUDAL AS NEEDED
Status: DISCONTINUED | OUTPATIENT
Start: 2020-06-03 | End: 2020-06-03 | Stop reason: HOSPADM

## 2020-06-03 RX ORDER — ZOLPIDEM TARTRATE 5 MG/1
5 TABLET ORAL
Status: DISCONTINUED | OUTPATIENT
Start: 2020-06-03 | End: 2020-06-05 | Stop reason: HOSPADM

## 2020-06-03 RX ORDER — EPHEDRINE SULFATE/0.9% NACL/PF 50 MG/5 ML
10 SYRINGE (ML) INTRAVENOUS
Status: DISCONTINUED | OUTPATIENT
Start: 2020-06-03 | End: 2020-06-03 | Stop reason: HOSPADM

## 2020-06-03 RX ORDER — SODIUM CHLORIDE, SODIUM LACTATE, POTASSIUM CHLORIDE, CALCIUM CHLORIDE 600; 310; 30; 20 MG/100ML; MG/100ML; MG/100ML; MG/100ML
125 INJECTION, SOLUTION INTRAVENOUS CONTINUOUS
Status: DISCONTINUED | OUTPATIENT
Start: 2020-06-03 | End: 2020-06-04

## 2020-06-03 RX ORDER — HYDROCORTISONE ACETATE PRAMOXINE HCL 2.5; 1 G/100G; G/100G
CREAM TOPICAL AS NEEDED
Status: DISCONTINUED | OUTPATIENT
Start: 2020-06-03 | End: 2020-06-05 | Stop reason: HOSPADM

## 2020-06-03 RX ORDER — NALOXONE HYDROCHLORIDE 0.4 MG/ML
0.4 INJECTION, SOLUTION INTRAMUSCULAR; INTRAVENOUS; SUBCUTANEOUS AS NEEDED
Status: DISCONTINUED | OUTPATIENT
Start: 2020-06-03 | End: 2020-06-03 | Stop reason: HOSPADM

## 2020-06-03 RX ORDER — DOCUSATE SODIUM 100 MG/1
100 CAPSULE, LIQUID FILLED ORAL
Status: DISCONTINUED | OUTPATIENT
Start: 2020-06-03 | End: 2020-06-05 | Stop reason: HOSPADM

## 2020-06-03 RX ORDER — FENTANYL/BUPIVACAINE/NS/PF 2-1250MCG
1-16 PREFILLED PUMP RESERVOIR EPIDURAL CONTINUOUS
Status: DISCONTINUED | OUTPATIENT
Start: 2020-06-03 | End: 2020-06-04

## 2020-06-03 RX ORDER — OXYTOCIN/0.9 % SODIUM CHLORIDE 30/500 ML
PLASTIC BAG, INJECTION (ML) INTRAVENOUS
Status: DISPENSED
Start: 2020-06-03 | End: 2020-06-04

## 2020-06-03 RX ORDER — SODIUM CHLORIDE 0.9 % (FLUSH) 0.9 %
5-40 SYRINGE (ML) INJECTION EVERY 8 HOURS
Status: DISCONTINUED | OUTPATIENT
Start: 2020-06-03 | End: 2020-06-05 | Stop reason: HOSPADM

## 2020-06-03 RX ORDER — FENTANYL CITRATE 50 UG/ML
INJECTION, SOLUTION INTRAMUSCULAR; INTRAVENOUS AS NEEDED
Status: DISCONTINUED | OUTPATIENT
Start: 2020-06-03 | End: 2020-06-03 | Stop reason: HOSPADM

## 2020-06-03 RX ORDER — AMMONIA 15 % (W/V)
1 AMPUL (EA) INHALATION AS NEEDED
Status: DISCONTINUED | OUTPATIENT
Start: 2020-06-03 | End: 2020-06-05 | Stop reason: HOSPADM

## 2020-06-03 RX ORDER — TERBUTALINE SULFATE 1 MG/ML
0.25 INJECTION SUBCUTANEOUS AS NEEDED
Status: DISCONTINUED | OUTPATIENT
Start: 2020-06-03 | End: 2020-06-03 | Stop reason: HOSPADM

## 2020-06-03 RX ORDER — IBUPROFEN 400 MG/1
800 TABLET ORAL EVERY 8 HOURS
Status: DISCONTINUED | OUTPATIENT
Start: 2020-06-03 | End: 2020-06-05 | Stop reason: HOSPADM

## 2020-06-03 RX ORDER — BUPIVACAINE HYDROCHLORIDE 2.5 MG/ML
25 INJECTION, SOLUTION EPIDURAL; INFILTRATION; INTRACAUDAL ONCE
Status: COMPLETED | OUTPATIENT
Start: 2020-06-03 | End: 2020-06-03

## 2020-06-03 RX ORDER — HYDROCORTISONE 1 %
CREAM (GRAM) TOPICAL AS NEEDED
Status: DISCONTINUED | OUTPATIENT
Start: 2020-06-03 | End: 2020-06-05 | Stop reason: HOSPADM

## 2020-06-03 RX ORDER — HYDROCODONE BITARTRATE AND ACETAMINOPHEN 5; 325 MG/1; MG/1
1 TABLET ORAL
Status: DISCONTINUED | OUTPATIENT
Start: 2020-06-03 | End: 2020-06-05 | Stop reason: HOSPADM

## 2020-06-03 RX ORDER — SODIUM CHLORIDE, SODIUM LACTATE, POTASSIUM CHLORIDE, CALCIUM CHLORIDE 600; 310; 30; 20 MG/100ML; MG/100ML; MG/100ML; MG/100ML
100 INJECTION, SOLUTION INTRAVENOUS CONTINUOUS
Status: DISCONTINUED | OUTPATIENT
Start: 2020-06-03 | End: 2020-06-03 | Stop reason: HOSPADM

## 2020-06-03 RX ORDER — SIMETHICONE 80 MG
80 TABLET,CHEWABLE ORAL
Status: DISCONTINUED | OUTPATIENT
Start: 2020-06-03 | End: 2020-06-05 | Stop reason: HOSPADM

## 2020-06-03 RX ORDER — ACETAMINOPHEN 325 MG/1
650 TABLET ORAL
Status: DISCONTINUED | OUTPATIENT
Start: 2020-06-03 | End: 2020-06-05 | Stop reason: HOSPADM

## 2020-06-03 RX ORDER — EPHEDRINE SULFATE/0.9% NACL/PF 50 MG/5 ML
SYRINGE (ML) INTRAVENOUS
Status: DISCONTINUED
Start: 2020-06-03 | End: 2020-06-03 | Stop reason: WASHOUT

## 2020-06-03 RX ORDER — FENTANYL CITRATE 50 UG/ML
100 INJECTION, SOLUTION INTRAMUSCULAR; INTRAVENOUS ONCE
Status: COMPLETED | OUTPATIENT
Start: 2020-06-03 | End: 2020-06-03

## 2020-06-03 RX ORDER — FENTANYL CITRATE 50 UG/ML
INJECTION, SOLUTION INTRAMUSCULAR; INTRAVENOUS
Status: COMPLETED
Start: 2020-06-03 | End: 2020-06-03

## 2020-06-03 RX ORDER — OXYTOCIN/0.9 % SODIUM CHLORIDE 30/500 ML
333 PLASTIC BAG, INJECTION (ML) INTRAVENOUS ONCE
Status: COMPLETED | OUTPATIENT
Start: 2020-06-03 | End: 2020-06-03

## 2020-06-03 RX ORDER — FENTANYL/BUPIVACAINE/NS/PF 2-1250MCG
PREFILLED PUMP RESERVOIR EPIDURAL
Status: COMPLETED
Start: 2020-06-03 | End: 2020-06-03

## 2020-06-03 RX ORDER — BUPIVACAINE HYDROCHLORIDE 2.5 MG/ML
INJECTION, SOLUTION EPIDURAL; INFILTRATION; INTRACAUDAL
Status: COMPLETED
Start: 2020-06-03 | End: 2020-06-03

## 2020-06-03 RX ORDER — SODIUM CHLORIDE 0.9 % (FLUSH) 0.9 %
5-40 SYRINGE (ML) INJECTION AS NEEDED
Status: DISCONTINUED | OUTPATIENT
Start: 2020-06-03 | End: 2020-06-05 | Stop reason: HOSPADM

## 2020-06-03 RX ADMIN — FENTANYL CITRATE 100 MCG: 50 INJECTION, SOLUTION INTRAMUSCULAR; INTRAVENOUS at 15:56

## 2020-06-03 RX ADMIN — FENTANYL CITRATE 100 MCG: 50 INJECTION INTRAMUSCULAR; INTRAVENOUS at 15:40

## 2020-06-03 RX ADMIN — IBUPROFEN 800 MG: 400 TABLET, FILM COATED ORAL at 19:50

## 2020-06-03 RX ADMIN — Medication 10 ML/HR: at 16:01

## 2020-06-03 RX ADMIN — BUPIVACAINE HYDROCHLORIDE 25 MG: 2.5 INJECTION, SOLUTION EPIDURAL; INFILTRATION; INTRACAUDAL at 15:40

## 2020-06-03 RX ADMIN — SODIUM CHLORIDE, SODIUM LACTATE, POTASSIUM CHLORIDE, AND CALCIUM CHLORIDE 1000 ML: 600; 310; 30; 20 INJECTION, SOLUTION INTRAVENOUS at 15:30

## 2020-06-03 RX ADMIN — OXYTOCIN-SODIUM CHLORIDE 0.9% IV SOLN 30 UNIT/500ML 19980 MILLI-UNITS/HR: 30-0.9/5 SOLUTION at 17:59

## 2020-06-03 RX ADMIN — BUPIVACAINE HYDROCHLORIDE 10 ML: 2.5 INJECTION, SOLUTION EPIDURAL; INFILTRATION; INTRACAUDAL; PERINEURAL at 15:56

## 2020-06-03 RX ADMIN — SODIUM CHLORIDE, SODIUM LACTATE, POTASSIUM CHLORIDE, AND CALCIUM CHLORIDE 125 ML/HR: 600; 310; 30; 20 INJECTION, SOLUTION INTRAVENOUS at 16:13

## 2020-06-03 NOTE — TELEPHONE ENCOUNTER
44 w 5d     patient    Calling to say that she started with intense cramping about 8-15 minutes apart, no ROM, good FM    She has an ultrasound appt/visit with  this afternoon to check her amniotic fluid status. She wants to know should she come now or wait for the afternoon appointment.

## 2020-06-03 NOTE — PROGRESS NOTES
Labor Progress Note  Patient seen, fetal heart rate and contraction pattern evaluated, patient examined. Patient Vitals for the past 1 hrs:   BP Pulse Resp SpO2   06/03/20 1610 104/65 83 16 97 %   06/03/20 1603 100/53 83 16 99 %   06/03/20 1556 107/74 82 16        Physical Exam:  Cervical Exam:  6 cm dilated    90% effaced    -2 station    Presenting Part: cephalic  Membranes:  Artificial Rupture of Membranes;  Amniotic Fluid: medium amount of clear fluid  Uterine Activity: Frequency: Every 3 minutes and Intensity: moderate  Fetal Heart Rate: Reactive  Accelerations: yes    Assessment/Plan:  Reassuring fetal status, Labor  Progressing normally  Continue expectant management, Continue plan for vaginal delivery

## 2020-06-03 NOTE — PROGRESS NOTES
1404: Pt arrived to L&D rm 6.    1539: Dr. Costa Leroy at bedside to place epidural.     174: Pt feeling increased pressure; SVE 10/100/+2.    1745: Dr. Mendel Palin at bedside for delivery; start pushing. 36:  liveborn male. 1930: Bedside and Verbal shift change report given to TIMOTHY Dia RN (oncoming nurse) by Rafael Jones RN (offgoing nurse). Report included the following information SBAR, Intake/Output, MAR and Recent Results.

## 2020-06-03 NOTE — TELEPHONE ENCOUNTER
Spoke with Darian Isabel that spoke with [facility]- advised patient to keep appt for this afternoon but to call us on ER line if changes occur. Advised to rest and push cold water.

## 2020-06-03 NOTE — ANESTHESIA PREPROCEDURE EVALUATION
Anesthetic History   No history of anesthetic complications            Review of Systems / Medical History  Patient summary reviewed, nursing notes reviewed and pertinent labs reviewed    Pulmonary  Within defined limits                 Neuro/Psych   Within defined limits           Cardiovascular  Within defined limits                Exercise tolerance: >4 METS     GI/Hepatic/Renal  Within defined limits              Endo/Other    Diabetes         Other Findings              Physical Exam    Airway  Mallampati: II  TM Distance: 4 - 6 cm  Neck ROM: normal range of motion   Mouth opening: Normal     Cardiovascular    Rhythm: regular  Rate: normal         Dental  No notable dental hx       Pulmonary  Breath sounds clear to auscultation               Abdominal  Abdominal exam normal       Other Findings            Anesthetic Plan    ASA: 2  Anesthesia type: epidural            Anesthetic plan and risks discussed with: Patient and Family

## 2020-06-03 NOTE — ANESTHESIA PROCEDURE NOTES
Epidural Block    Start time: 6/3/2020 4:02 PM  Performed by: Layla Jacques MD  Authorized by: Layla Jacques MD     Pre-Procedure  Indication: labor epidural    Preanesthetic Checklist: patient identified, risks and benefits discussed, anesthesia consent, site marked, patient being monitored, timeout performed and anesthesia consent    Timeout Time: 16:02        Epidural:   Patient position:  Seated  Prep region:  Lumbar  Prep: Patient draped and Chlorhexidine    Location:  L3-4    Needle and Epidural Catheter:   Needle Type:  Tuohy  Needle Gauge:  17 G  Injection Technique:  Loss of resistance using saline  Attempts:  2  Catheter Size:  18 G  Catheter at Skin Depth (cm):  13  Depth in Epidural Space (cm):  5  Events: no blood with aspiration, no cerebrospinal fluid with aspiration, no paresthesia and negative aspiration test    Test Dose:  Negative    Assessment:   Catheter Secured:  Tegaderm and tape  Insertion:  Uncomplicated  Patient tolerance:  Patient tolerated the procedure well with no immediate complications  First attempt unable to thread catheter, second attempt 1 level lower

## 2020-06-03 NOTE — H&P
History & Physical    Name: Trista Lemus MRN: [id number]  SSN: [id number]    YOB: 1990  Age: 26 y.o. Sex: female        Subjective:     Estimated Date of Delivery: 20  OB History        2    Para   1    Term   1            AB        Living   1       SAB        TAB        Ectopic        Molar        Multiple        Live Births                    Ms. Kat Vu is admitted with pregnancy at 39w5d for active labor. Prenatal course as below. Please see prenatal records for details. 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 Past Medical History:   Diagnosis Date    Gestational diabetes      History reviewed. No pertinent surgical history. Social History     Occupational History    Not on file   Tobacco Use    Smoking status: Never Smoker    Smokeless tobacco: Never Used   Substance and Sexual Activity    Alcohol use: Not Currently    Drug use: Never    Sexual activity: Yes     Partners: Male     Birth control/protection: None     Family History   Problem Relation Age of Onset    Hypertension Mother     Breast Cancer Paternal Aunt        No Known Allergies  Prior to Admission medications    Medication Sig Start Date End Date Taking? Authorizing Provider   PNV PU.46/CBBDZAD fum/folic ac (PRENATAL PO) Take  by mouth. Yes Provider, Historical   famotidine (PEPCID PO) Take  by mouth.    Yes Provider, Historical   pyridoxine, vitamin B6, (VITAMIN B-6) 25 mg tablet Take 25 mg by mouth daily. Yes Provider, Historical   doxylamine succinate (UNISOM) 25 mg tablet Take 12.5 mg by mouth nightly as needed for Sleep. Provider, Historical        Review of Systems: A comprehensive review of systems was negative except for that written in the HPI. Objective:     Vitals:  Vitals:    06/03/20 1413   Weight: 173 lb (78.5 kg)   Height: 5' 4\" (1.626 m)        Physical Exam:  Patient without distress. Heart: Regular rate and rhythm  Lung: clear to auscultation throughout lung fields, no wheezes, no rales, no rhonchi and normal respiratory effort  Cervical Exam: 5 cm dilated    90% effaced    -2 station    Presenting Part: cephalic  Membranes:  Intact  Fetal Heart Rate: Reactive  Accelerations: yes    Prenatal Labs:   Lab Results   Component Value Date/Time    Rubella, External Immune 11/21/2019    GrBStrep, External Negative 05/12/2020    HBsAg, External Negative 11/21/2019    HIV, External Non reactive 11/21/2019    Gonorrhea, External Negative 11/21/2019    Chlamydia, External Negative 11/21/2019        Assessment/Plan:     Plan: Admit for Reassuring fetal status, Labor  Progressing normally  Continue expectant management, Continue plan for vaginal delivery. Group B Strep was negative.     Signed By:  Jose Chao MD     Miryam 3, 2020

## 2020-06-04 PROCEDURE — 74011250637 HC RX REV CODE- 250/637: Performed by: OBSTETRICS & GYNECOLOGY

## 2020-06-04 PROCEDURE — 65410000002 HC RM PRIVATE OB

## 2020-06-04 RX ADMIN — IBUPROFEN 800 MG: 400 TABLET, FILM COATED ORAL at 12:18

## 2020-06-04 RX ADMIN — IBUPROFEN 800 MG: 400 TABLET, FILM COATED ORAL at 20:31

## 2020-06-04 RX ADMIN — IBUPROFEN 800 MG: 400 TABLET, FILM COATED ORAL at 03:33

## 2020-06-04 NOTE — LACTATION NOTE
This note was copied from a baby's chart. Initial Lactation Consultation:  Mom is 33yo   Delivered yesterday at [id number] [facility] gestation 44 5/7 weeks. Lactation history: breast fed her 2.4yo for 15 months complicated initially by tongue and lip tie that were laser revised at 3 weeks, no other concerns. Discussed with parents: positioning and alternating positions, using pillows to support nursing couplet, characteristics deep v shallow latch, and feeding cues. Demonstrated breast massage and hand expression with drops of colostrum easily expressed. Infant at breast just finished cluster feed after being sleepy secondary to circumcision this morning. Discussed: nutrition, pumping, lactogenesis with parents. [place] behaviors reviewed, Very sleepy in first 24 hours, mother must wake baby for feedings, offer hand expressed drops, baby usually will respond and feed vigorously 6-8 times in the first day, but is important to offer 8-12 times,  After baby wakes from deep sleep usually on the 2nd or 3rd day a new behavior pattern follows. Frequent feeding during this brief behavioral phase preceeding milk transition is called cluster feeding. Typical  behavior: baby becomes vigorous at the breast and wants to feed frequently- every 1-2 hours for several feedings. This is the normal process by which the baby demands his/her supply. This type of frequent feeding is the stimulation which causes lactogenesis II (milk coming in). Feeding Plan: Mother will keep baby skin to skin as often as possible, feed on demand, 8-12x/day , respond to feeding cues, obtain latch, listen for audible swallowing, be aware of signs of oxytocin release/ cramping,thirst,sleepiness while breastfeeding, offer both breasts,and will not limit feedings. Mother agrees to utilize breast massage while nursing to facilitate lactogenesis.

## 2020-06-04 NOTE — DISCHARGE SUMMARY
Obstetrical Discharge Summary     Name: Cyn Leonard MRN: [id number]  SSN: [id number]    YOB: 1990  Age: 26 y.o. Sex: female      Admit Date: 6/3/2020    Discharge Date: 2020     Admitting Physician: Brittany Abdalla MD     Attending Physician:  Leonora Sy MD     Admission Diagnoses: maternity    Discharge Diagnoses:   Information for the patient's :  Az Adler [[id number]]   Delivery of a 8 lb 1.6 oz (3.675 kg) male infant via Vaginal, Spontaneous on 6/3/2020 at 9:48 PM  by Brittany Abdalla. Apgars were 8  and 9 . Additional Diagnoses:   Hospital Problems  Date Reviewed: 6/3/2020    None         Lab Results   Component Value Date/Time    Rubella, External Immune 2019    GrBStrep, External Negative 2020       Hospital Course: Normal hospital course following the delivery. Disposition at Discharge: Home or self care    Discharged Condition: Stable    Patient Instructions:   Current Discharge Medication List      CONTINUE these medications which have NOT CHANGED    Details   PNV EY.44/YLDCGIJ fum/folic ac (PRENATAL PO) Take  by mouth. famotidine (PEPCID PO) Take  by mouth. STOP taking these medications       pyridoxine, vitamin B6, (VITAMIN B-6) 25 mg tablet Comments:   Reason for Stopping:               Reference my discharge instructions.     Follow-up Appointments   Procedures    FOLLOW UP VISIT Appointment in: 6 Weeks     Standing Status:   Standing     Number of Occurrences:   1     Order Specific Question:   Appointment in     Answer:   6 Weeks        Signed By:  Brittany Abdalla MD     2020

## 2020-06-04 NOTE — ROUTINE PROCESS
0730:Bedside and Verbal shift change report given to SWAPNIL Ferrari (oncoming nurse) by GORDON Silva (offgoing nurse). Report included the following information SBAR.

## 2020-06-04 NOTE — L&D DELIVERY NOTE
Delivery Summary    Patient: Jc Whittaker MRN: [id number]  SSN: [id number]    YOB: 1990  Age: 26 y.o. Sex: female       Information for the patient's :  Amilcar Alcala [[id number]]       Labor Events:    Labor: No    Steroids: None   Cervical Ripening Date/Time:       Cervical Ripening Type: None   Antibiotics During Labor: No   Rupture Identifier:      Rupture Date/Time: 6/3/2020 4:39 PM   Rupture Type: AROM   Amniotic Fluid Volume: Moderate    Amniotic Fluid Description: Clear    Amniotic Fluid Odor:      Induction: None       Induction Date/Time:        Indications for Induction:      Augmentation: None   Augmentation Date/Time:      Indications for Augmentation:     Labor complications: None       Additional complications:        Delivery Events:  Indications For Episiotomy:     Episiotomy: None   Perineal Laceration(s): 2nd   Repaired:     Periurethral Laceration Location:      Repaired:     Labial Laceration Location:     Repaired:     Sulcal Laceration Location:     Repaired:     Vaginal Laceration Location:     Repaired:     Cervical Laceration Location:     Repaired:     Repair Suture: Rapide 3-0   Number of Repair Packets: 1   Estimated Blood Loss (ml):  ml   Quantitative Blood Loss (ml)                Delivery Date: 6/3/2020    Delivery Time: 5:56 PM  Delivery Type: Vaginal, Spontaneous  Sex:  Male    Gestational Age: 38w11d   Delivery Clinician:  Irby Gilford  Living Status: Living   Delivery Location: L&D room 6          APGARS  One minute Five minutes Ten minutes   Skin color: 0   1        Heart rate: 2   2        Grimace: 2   2        Muscle tone: 2   2        Breathin   2        Totals: 8   9            Presentation: Vertex    Position:        Resuscitation Method:  Tactile Stimulation     Meconium Stained: None      Cord Information:    Complications: None;Nuchal Cord Without Compressions  Cord around:    Delayed cord clamping?  Yes  Cord clamped date/time:6/3/2020  5:57 PM  Disposition of Cord Blood: Discard    Blood Gases Sent?: No    Placenta:  Date/Time: 6/3/2020  5:59 PM  Removal: Expressed      Appearance: Normal      Measurements:  Birth Weight: 8 lb 1.6 oz (3.675 kg)      Birth Length: 1' 8.5\" (0.521 m)      Head Circumference: 1' 0.99\" (0.33 m)      Chest Circumference:       Abdominal Girth: 1' 0.21\" (0.31 m)    Other Providers:   HERMINIA OSWALD;Francisco SHAH, Obstetrician;Primary Nurse;Primary [place] Nurse           Group B Strep:   Lab Results   Component Value Date/Time    GrBStrep, External Negative 2020     Information for the patient's :  Kenney Pfeiffer [[id number]]   No results found for: ABORH, PCTABR, PCTDIG, BILI, ABORHEXT, ABORH    No results for input(s): PCO2CB, PO2CB, HCO3I, SO2I, IBD, PTEMPI, SPECTI, PHICB, ISITE, IDEV, IALLEN in the last 72 hours.

## 2020-06-04 NOTE — PROGRESS NOTES
1944 - Bedside and Verbal shift change report given to TIMOTHY Salcedo RN (oncoming nurse) by Sara Mcadams RN (offgoing nurse). Report included the following information SBAR and MAR.     2100 - TRANSFER - OUT REPORT:    Verbal report given to GORDON Gamino RN(name) on [facility]  being transferred to MIU(unit) for routine progression of care       Report consisted of patients Situation, Background, Assessment and   Recommendations(SBAR). Information from the following report(s) SBAR and MAR was reviewed with the receiving nurse. Lines:   Peripheral IV 06/03/20 Left Forearm (Active)   Site Assessment Clean, dry, & intact 6/3/2020  7:44 PM   Phlebitis Assessment 0 6/3/2020  7:44 PM   Infiltration Assessment 0 6/3/2020  7:44 PM   Dressing Status Clean, dry, & intact 6/3/2020  7:44 PM   Dressing Type Tape;Transparent 6/3/2020  7:44 PM   Hub Color/Line Status Pink 6/3/2020  7:44 PM   Alcohol Cap Used Yes 6/3/2020  7:44 PM        Opportunity for questions and clarification was provided.       Patient transported with:   Registered Nurse

## 2020-06-04 NOTE — PROGRESS NOTES
TRANSFER - IN REPORT:    Verbal report received from TIMOTHY Dia RN(name) on Greg Huang  being received from L&D  (unit) for routine progression of care      Report consisted of patients Situation, Background, Assessment and   Recommendations(SBAR). Information from the following report(s) SBAR, Kardex, Intake/Output, MAR and Recent Results was reviewed with the receiving nurse. Opportunity for questions and clarification was provided. Assessment completed upon patients arrival to unit and care assumed.

## 2020-06-04 NOTE — PROGRESS NOTES
Post-Partum Day Number 1 Progress Note    Elli Meléndez     Assessment: Doing well, post partum day 1    Plan:  1. Continue routine postpartum and perineal care as well as maternal education. 2. The risks and benefits of the circumcision  procedure and anesthesia including: bleeding, infection, variability of cosmetic results were discussed at length with the mother. She is aware that future repeat procedures may be necessary. She gives informed consent to proceed as noted and her questions are answered. Information for the patient's :  Tram Whitaker [[id number]]   Vaginal, Spontaneous   Patient doing well without significant complaint. Voiding without difficulty, normal lochia. Vitals:  Visit Vitals  /68 (BP 1 Location: Left arm, BP Patient Position: At rest)   Pulse 77   Temp 97.9 °F (36.6 °C)   Resp 16   Ht 5' 4\" (1.626 m)   Wt 173 lb (78.5 kg)   LMP 2019   SpO2 96%   Breastfeeding Unknown   BMI 29.70 kg/m²     Temp (24hrs), Av.1 °F (36.7 °C), Min:97.8 °F (36.6 °C), Max:98.2 °F (36.8 °C)        Exam:   Patient without distress. Abdomen soft, fundus firm, nontender                Perineum with normal lochia noted. Lower extremities are negative for swelling, cords or tenderness.     Labs:     Lab Results   Component Value Date/Time    WBC 10.5 2020 02:44 PM    WBC 11.4 (H) 2020 11:54 AM    WBC 8.7 2019 11:10 AM    HGB 11.1 (L) 2020 02:44 PM    HGB 10.0 (L) 2020 11:54 AM    HGB 12.9 2019 11:10 AM    HCT 34.3 (L) 2020 02:44 PM    HCT 29.4 (L) 2020 11:54 AM    HCT 38.0 2019 11:10 AM    PLATELET 799  02:44 PM    PLATELET 452  11:54 AM    PLATELET 394  11:10 AM    Hgb, External 10.0 2020    Hgb, External 12.9 2019    Hct, External 29.4 2020    Hct, External 38.0 2019    Platelet cnt., External 241 2020    Platelet cnt., External 277 2019 Recent Results (from the past 24 hour(s))   CBC W/O DIFF    Collection Time: 06/03/20  2:44 PM   Result Value Ref Range    WBC 10.5 3.6 - 11.0 K/uL    RBC 4.19 3.80 - 5.20 M/uL    HGB 11.1 (L) 11.5 - 16.0 g/dL    HCT 34.3 (L) 35.0 - 47.0 %    MCV 81.9 80.0 - 99.0 FL    MCH 26.5 26.0 - 34.0 PG    MCHC 32.4 30.0 - 36.5 g/dL    RDW 19.5 (H) 11.5 - 14.5 %    PLATELET 554 740 - 727 K/uL    MPV 11.6 8.9 - 12.9 FL    NRBC 0.0 0  WBC    ABSOLUTE NRBC 0.00 0.00 - 0.01 K/uL

## 2020-06-04 NOTE — ROUTINE PROCESS
Bedside shift change report given to Coco Rossi RN (oncoming nurse) by Yonatan Mejia RN (offgoing nurse). Report included the following information SBAR.

## 2020-06-05 VITALS
HEIGHT: 64 IN | WEIGHT: 173 LBS | DIASTOLIC BLOOD PRESSURE: 80 MMHG | BODY MASS INDEX: 29.53 KG/M2 | RESPIRATION RATE: 16 BRPM | TEMPERATURE: 97.9 F | OXYGEN SATURATION: 96 % | HEART RATE: 87 BPM | SYSTOLIC BLOOD PRESSURE: 117 MMHG

## 2020-06-05 PROCEDURE — 74011250637 HC RX REV CODE- 250/637: Performed by: OBSTETRICS & GYNECOLOGY

## 2020-06-05 PROCEDURE — 77030021125

## 2020-06-05 RX ADMIN — IBUPROFEN 800 MG: 400 TABLET, FILM COATED ORAL at 12:16

## 2020-06-05 RX ADMIN — IBUPROFEN 800 MG: 400 TABLET, FILM COATED ORAL at 04:28

## 2020-06-05 NOTE — LACTATION NOTE
This note was copied from a baby's chart. Infant weight loss -4.3%. Bilirubin high intermediate, repeated 0800. I did not see infant at breast. Per mom: Baby nursing well and has improved throughout post partum stay, deep latch maintained, mother is comfortable, milk is in transition, baby feeding vigorously with rhythmic suck, swallow, breathe pattern, with audible swallowing, and evident milk transfer, both breasts offerd, baby is asleep following feeding. Baby is feeding on demand, voiding and stools present as appropriate over the last 24 hours. Breasts may become engorged when milk \"comes in\". How milk is made / normal phases of milk production, supply and demand discussed. Taught care of engorged breasts - frequent breastfeeding encouraged, warm compresses and breast massage ac. Then nurse the baby or pump. Apply cold compresses pc x 15 minutes a few times a day for swelling or discomfort. May need to do this care for a couple of days. Discussed prevention and treatment of mastitis. All lactation teaching completed and questions answered. Parents verbalizing confidence with infant feeding.

## 2020-06-05 NOTE — PROGRESS NOTES
Post-Partum Day Number 2 Progress Note    Elli Meléndez     Assessment: Doing well, post partum day 2    Plan:   1. Discharge home today  2. Follow up in office in 6 weeks with Leonora Sy MD  3. Post partum activity advised, diet as tolerated  4. Discharge Medications: ibuprofen,    medications prior to admission    Information for the patient's :  Az Adler [[id number]]   Vaginal, Spontaneous   Patient doing well without significant complaint. Voiding without difficulty, normal lochia. Vitals:  Visit Vitals  /72 (BP 1 Location: Right arm, BP Patient Position: At rest)   Pulse 77   Temp 98.3 °F (36.8 °C)   Resp 16   Ht 5' 4\" (1.626 m)   Wt 173 lb (78.5 kg)   LMP 2019   SpO2 96%   Breastfeeding Unknown   BMI 29.70 kg/m²     Temp (24hrs), Av.3 °F (36.8 °C), Min:98 °F (36.7 °C), Max:98.6 °F (37 °C)      Exam:         Patient without distress. Abdomen soft, fundus firm, nontender                 Lower extremities are negative for swelling, cords or tenderness. Labs:     Lab Results   Component Value Date/Time    WBC 10.5 2020 02:44 PM    WBC 11.4 (H) 2020 11:54 AM    WBC 8.7 2019 11:10 AM    HGB 11.1 (L) 2020 02:44 PM    HGB 10.0 (L) 2020 11:54 AM    HGB 12.9 2019 11:10 AM    HCT 34.3 (L) 2020 02:44 PM    HCT 29.4 (L) 2020 11:54 AM    HCT 38.0 2019 11:10 AM    PLATELET 141  02:44 PM    PLATELET 923  11:54 AM    PLATELET 748  11:10 AM    Hgb, External 10.0 2020    Hgb, External 12.9 2019    Hct, External 29.4 2020    Hct, External 38.0 2019    Platelet cnt., External 241 2020    Platelet cnt., External 277 2019       No results found for this or any previous visit (from the past 24 hour(s)).

## 2020-06-05 NOTE — DISCHARGE INSTRUCTIONS
POST DELIVERY DISCHARGE INSTRUCTIONS    Name: Tawanda Lucero  YOB: 1990  Primary Diagnosis: Active Problems:    * No active hospital problems. *      General:     Diet/Diet Restrictions:  Eight 8-ounce glasses of fluid daily (water, juices); avoid excessive caffeine intake. Meals/snacks as desired which are high in fiber and carbohydrates and low in fat and cholesterol. Physical Activity / Restrictions / Safety:     Avoid heavy lifting, no more that 8 lbs. For 2-3 weeks; limit use of stairs to 2 times daily for the first week home. No driving for one week. Avoid intercourse 4-6 weeks, no douching or tampon use. Check with obstetrician before starting or resuming an exercise program.         Discharge Instructions/Special Treatment/Home Care Needs:     Continue prenatal vitamins. Continue to use squirt bottle with warm water on your episiotomy after each bathroom use until bleeding stops. If steri-strips applied to your incision, remove in 7-10 days. Call your doctor for the following:     Fever over 101 degrees by mouth. Vaginal bleeding heavier than a normal menstrual period or clot larger than a golf ball. Red streaks or increased swelling of legs, painful red streaks on your breast.  Painful urination, constipation and increased pain or swelling or discharge with your incision. If you feel extremely anxious or overwhelmed. If you have thoughts of harming yourself and/or your baby. Pain Management:     Pain Management:   Take Acetaminophen (Tylenol) or Ibuprofen (Advil, Motrin), as directed for pain. Use a warm Sitz bath 3 times daily to relieve episiotomy or hemorrhoidal discomfort. Heating pad to  incision as needed. For hemorrhoidal discomfort, use Tucks and Anusol cream as needed and directed. Follow-Up Care:      These are general instructions for a healthy lifestyle:    No smoking/ No tobacco products/ Avoid exposure to second hand smoke    Surgeon Kim Gowers Warning:  Quitting smoking now greatly reduces serious risk to your health. Obesity, smoking, and sedentary lifestyle greatly increases your risk for illness    A healthy diet, regular physical exercise & weight monitoring are important for maintaining a healthy lifestyle    Recognize signs and symptoms of STROKE:    F-face looks uneven    A-arms unable to move or move unevenly    S-speech slurred or non-existent    T-time-call 911 as soon as signs and symptoms begin-DO NOT go       Back to bed or wait to see if you get better-TIME IS BRAIN. POSTPARTUM DISCHARGE INSTRUCTIONS       Name:  Tanner Hays  YOB: 1990  Admission Diagnosis:  Labor without complication [Q20]     Discharge Diagnosis:    Problem List as of 2020 Date Reviewed: 6/3/2020          Codes Class Noted - Resolved    Labor without complication UEF-70-CT: Z41  ICD-9-CM: 650  2020 - Present        Prenatal care of multigravida, antepartum ICD-10-CM: Z34.80  ICD-9-CM: V22.1  10/30/2019 - Present    Overview Addendum 2020  3:04 AM by Jhoana Bright MD     Primary Provider: Rivera Levin P1  EDC by LMP/US  Hx gestational diabetes, diet controlled  Covid negative   7lb6oz son, \"Bam\" 1yo; induction 38 wk [address]  for severe pain after meals- negative; resolved   Anemia, hgb 10.0  , Bradley Elizabeth -   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 It is easy to get too tired and overwhelmed during the first weeks after your baby is born. Changes in your hormones can shift your mood without warning.  You may find it hard to meet the extra demands on your energy and time. Take it easy on yourself. Follow-up care is a key part of your treatment and safety. Be sure to make and go to all appointments, and call your doctor if you are having problems. It's also a good idea to know your test results and keep a list of the medicines you take. How can you care for yourself at home? Vaginal Bleeding and Cramps  · After delivery, you will have a bloody discharge from the vagina. This will turn pink within a week and then white or yellow after about 10 days. It may last for 2 to 4 weeks or longer, until the uterus has healed. Use pads instead of tampons until you stop bleeding. · Do not worry if you pass some blood clots, as long as they are smaller than a golf ball. If you have a tear or stitches in your vaginal area, change the pad at least every 4 hours to prevent soreness and infection. · You may have cramps for the first few days after childbirth. These are normal and occur as the uterus shrinks to normal size. Take an over-the-counter pain medicine, such as acetaminophen (Tylenol), ibuprofen (Advil, Motrin), or naproxen (Aleve), for cramps. Read and follow all instructions on the label. Do not take aspirin, because it can cause more bleeding. Do not take acetaminophen (Tylenol) and other acetaminophen containing medications (i.e. Percocet) at the same time. Episiotomy, Lacerations or Tears  · If you have stitches, they will dissolve on their own and do not need to be removed. · Put ice or a cold pack on your painful area for 10 to 20 minutes at a time, several times a day, for the first few days. Put a thin cloth between the ice and your skin. · Sit in a few inches of warm water (sitz bath) 3 times a day and after bowel movements. The warm water helps with pain and itching. If you do not have a tub, a warm shower might help. Breast fullness  · Your breasts may overfill (engorge) in the first few days after delivery.  To help milk flow and to relieve pain, warm your breasts in the shower or by using warm, moist towels before nursing. · If you are not nursing, do not put warmth on your breasts or touch your breasts. Wear a tight bra or sports bra and use ice until the fullness goes away. This usually takes 2 to 3 days. · Put ice or a cold pack on your breast after nursing to reduce swelling and pain. Put a thin cloth between the ice and your skin. Activity  · Eat a balanced diet. Do not try to lose weight by cutting calories. Keep taking your prenatal vitamins, or take a multivitamin. · Get as much rest as you can. Try to take naps when your baby sleeps during the day. · Get some exercise every day. But do not do any heavy exercise until your doctor says it is okay. · Wait until you are healed (about 4 to 6 weeks) before you have sexual intercourse. Your doctor will tell you when it is okay to have sex. · Talk to your doctor about birth control. You can get pregnant even before your period returns. Also, you can get pregnant while you are breast-feeding. Mental Health  · Many women get the \"baby blues\" during the first few days after childbirth. You may lose sleep, feel irritable, and cry easily. You may feel happy one minute and sad the next. Hormone changes are one cause of these emotional changes. Also, the demands of a new baby, along with visits from relatives or other family needs, add to a mother's stress. The \"baby blues\" often peak around the fourth day. Then they ease up in less than 2 weeks. · If your moodiness or anxiety lasts for more than 2 weeks, or if you feel like life is not worth living, you may have postpartum depression. This is different for each mother. Some mothers with serious depression may worry intensely about their infant's well-being. Others may feel distant from their child. Some mothers might even feel that they might harm their baby.  A mother may have signs of paranoia, wondering if someone is watching her. · With all the changes in your life, you may not know if you are depressed. Pregnancy sometimes causes changes in how you feel that are similar to the symptoms of depression. · Symptoms of depression include:  · Feeling sad or hopeless and losing interest in daily activities. These are the most common symptoms of depression. · Sleeping too much or not enough. · Feeling tired. You may feel as if you have no energy. · Eating too much or too little. · POSTPARTUM SUPPORT INTERNATIONAL (PSI) offers a Warm line; Chat with the Expert phone sessions; Information and Articles about Pregnancy and Postpartum Mood Disorders; Comprehensive List of Free Support Groups; Knowledgeable local coordinators who will offer support, information, and resources; Guide to Resources on Focus; Calendar of events in the  mood disorders community; Latest News and Research; and [facility] [address] for [employer]. Remember - You are not alone; You are not to blame; With help, you will be well. 6-589-513-PPD(9489). WWW. POSTPARTUM. NET    · Writing or talking about death, such as writing suicide notes or talking about guns, knives, or pills. Keep the numbers for these national suicide hotlines: 2-856-631-TALK (4-896.696.5521) and 0-763-VIBXEBG (8-491.197.1623). If you or someone you know talks about suicide or feeling hopeless, get help right away. Constipation and Hemorrhoids  Drink plenty of fluids, enough so that your urine is light yellow or clear like water. If you have kidney, heart, or liver disease and have to limit fluids, talk with your doctor before you increase the amount of fluids you drink. · Eat plenty of fiber each day. Have a bran muffin or bran cereal for breakfast, and try eating a piece of fruit for a mid-afternoon snack. · For painful, itchy hemorrhoids, put ice or a cold pack on the area several times a day for 10 minutes at a time.  Follow this by putting a warm compress on the area for another 10 to 20 minutes or by sitting in a shallow, warm bath. When should you call for help? Call 911 anytime you think you may need emergency care. For example, call if:  · You are thinking of hurting yourself, your baby, or anyone else. · You passed out (lost consciousness). · You have symptoms of a blood clot in your lung (called a pulmonary embolism). These may include:  · Sudden chest pain. · Trouble breathing. · Coughing up blood. Call your doctor now or seek immediate medical care if:  · You have severe vaginal bleeding. · You are soaking through a pad each hour for 2 or more hours. · Your vaginal bleeding seems to be getting heavier or is still bright red 4 days after delivery. · You are dizzy or lightheaded, or you feel like you may faint. · You are vomiting or cannot keep fluids down. · You have a fever. · You have new or more belly pain. · You pass tissue (not just blood). · Your vaginal discharge smells bad. · Your belly feels tender or full and hard. · Your breasts are continuously painful or red. · You feel sad, anxious, or hopeless for more than a few days. · You have sudden, severe pain in your belly. · You have symptoms of a blood clot in your leg (called a deep vein thrombosis), such as:  · Pain in your calf, back of the knee, thigh, or groin. · Redness and swelling in your leg or groin. · You have symptoms of preeclampsia, such as:  · Sudden swelling of your face, hands, or feet. · New vision problems (such as dimness or blurring). · A severe headache. · Your blood pressure is higher than it should be or rises suddenly. · You have new nausea or vomiting. Watch closely for changes in your health, and be sure to contact your doctor if you have any problems. Additional Information:  Postpartum Support    PARENTS:  Are you feeling sad or depressed? Is it difficult for you to enjoy yourself? Do you feel more irritable or tense?  Do you feel anxious or panicky? Are you having difficulty bonding with your baby? Do you feel as if you are \"out of control\" or \"going crazy\"? Are you worried that you might hurt your baby or yourself? FAMILIES: Do you worry that something is wrong but don't know how to help? Do you think that your partner or spouse is having problems coping? Are you worried that it may never get better? While many women experience some mild mood change or \"the blues\" during or after the birth of a child, 1 in 9 women experience more significant symptoms of depression or anxiety. 1 in 10 Dads become depressed during the first year. Things you can do  Being a good parent includes taking care of yourself. If you take care of yourself, you will be able to take better care of your baby and your family. · Talk to a counselor or healthcare provider who has training in  mood and anxiety problems. · Learn as much as you can about pregnancy and postpartum depression and anxiety. · Get support from family and friends. Ask for help when you need it. · Join a support group in your area or online. · Keep active by walking, stretching or whatever form of exercise helps you to feel better. · Get enough rest and time for yourself. · Eat a healthy diet. · Don't give up! It may take more than one try to get the right help you need. These are general instructions for a healthy lifestyle:    No smoking/ No tobacco products/ Avoid exposure to second hand smoke    Surgeon General's Warning:  Quitting smoking now greatly reduces serious risk to your health.     Obesity, smoking, and sedentary lifestyle greatly increases your risk for illness    A healthy diet, regular physical exercise & weight monitoring are important for maintaining a healthy lifestyle    Recognize signs and symptoms of STROKE:    F-face looks uneven    A-arms unable to move or move unevenly    S-speech slurred or non-existent    T-time-call 911 as soon as signs and symptoms begin - DO NOT go       back to bed or wait to see if you get better - TIME IS BRAIN. I have had the opportunity to make my options or choices for discharge. I have received and understand these instructions.

## 2020-06-05 NOTE — ROUTINE PROCESS
0730:Bedside and Verbal shift change report given to SWAPNIL Sampson (oncoming nurse) by SWAPNIL Landry (offgoing nurse). Report included the following information SBAR.  
 
0873: I have reviewed discharge instructions with the patient. The patient verbalized understanding. All questions answered. Pt being discharged home with . Taken to main entrance for discharge by volunteers in wheelchair.

## 2020-07-17 ENCOUNTER — OFFICE VISIT (OUTPATIENT)
Dept: OBGYN CLINIC | Age: 30
End: 2020-07-17

## 2020-07-17 VITALS
DIASTOLIC BLOOD PRESSURE: 76 MMHG | BODY MASS INDEX: 26.29 KG/M2 | WEIGHT: 154 LBS | SYSTOLIC BLOOD PRESSURE: 106 MMHG | HEIGHT: 64 IN

## 2020-07-17 PROBLEM — Z34.80 PRENATAL CARE OF MULTIGRAVIDA, ANTEPARTUM: Status: RESOLVED | Noted: 2019-10-30 | Resolved: 2020-07-17

## 2020-07-17 NOTE — PROGRESS NOTES
Postpartum evaluation    Dontrell Dubois is a 26 y.o. female who presents for a postpartum exam.     She is now 6 weeks post normal spontaneous vaginal delivery. Her baby is doing well, Andrew Christianson. She has had no menses since delivery. She has had the following significant problems since her delivery: none    The patient is breast feeding without difficulty. The patient would like to use condoms or natural family planning for birth control; may consider an IUD in near future. She is currently taking: no medications. She is due for her next AE in 12 months.      Visit Vitals  /76 (BP 1 Location: Left arm, BP Patient Position: Sitting)   Ht 5' 4\" (1.626 m)   Wt 154 lb (69.9 kg)   Breastfeeding Yes   BMI 26.43 kg/m²       PHYSICAL EXAMINATION    Constitutional  · Appearance: well-nourished, well developed, alert, in no acute distress    HENT  · Head and Face: appears normal    Gastrointestinal  · Abdominal Examination: abdomen non-tender to palpation, normal bowel sounds, no masses present  · Liver and spleen: no hepatomegaly present, spleen not palpable  · Hernias: no hernias identified    Genitourinary  · External Genitalia: normal appearance for age, no discharge present, no tenderness present, no inflammatory lesions present, no masses present, no atrophy present  · Vagina: normal vaginal vault without central or paravaginal defects, no discharge present, no inflammatory lesions present, no masses present  · Bladder: non-tender to palpation  · Urethra: appears normal  · Cervix: normal   · Uterus: normal size, shape and consistency  · Adnexa: no adnexal tenderness present, no adnexal masses present  · Perineum: perineum within normal limits, no evidence of trauma, no rashes or skin lesions present  · Anus: anus within normal limits, no hemorrhoids present  · Inguinal Lymph Nodes: no lymphadenopathy present    Skin  · General Inspection: no rash, no lesions identified    Neurologic/Psychiatric  · Mental Status:  · Orientation: grossly oriented to person, place and time  · Mood and Affect: mood normal, affect appropriate    Assessment:  Normal postpartum check    Plan:  Patient declines presence of chaperone during today's visit. Pap today  RTO for AE.

## 2020-07-17 NOTE — PATIENT INSTRUCTIONS
Breastfeeding: Care Instructions  Overview     Breastfeeding has many benefits. It may lower your baby's chances of getting an infection. It also may make it less likely that your baby will have problems such as diabetes and obesity later in life. Breastfeeding also helps you bond with your baby. In the first days after birth, your breasts make a thick, yellow liquid called colostrum. This liquid gives your baby nutrients and antibodies against infection. It is all that babies need in the first days after birth. Your breasts will fill with milk a few days after the birth. Breastfeeding is a skill that gets better with practice. Be patient with yourself and your baby. If you have trouble, you can get help and keep breastfeeding. Follow-up care is a key part of your treatment and safety. Be sure to make and go to all appointments, and call your doctor if you are having problems. It's also a good idea to know your test results and keep a list of the medicines you take. How can you care for yourself at home? · Breastfeed your baby whenever he or she is hungry. In the first 2 weeks, your baby will breastfeed at least 8 times in a 24-hour period. This will help you keep up your supply of milk. Signs that your baby is hungry include:  ? Sucking on his or her hands. ? [place] his or her lips. ? Turning his or her head toward your breast.  · Put a bed pillow or a nursing pillow on your lap to support your arms and your baby. · Hold your baby in a comfortable position. ? You can hold your baby in several ways. One of the most common positions is the cradle hold. One arm supports your baby, with his or her head in the bend of your elbow. Your open hand supports your baby's bottom or back. Your baby's belly lies against yours. ? If you had your baby by , or , try the football hold. This position keeps your baby off your belly.  Tuck your baby under your arm, with his or her body along the side you will be feeding on. Support your baby's upper body with your arm. With that hand you can control your baby's head to bring his or her mouth to your breast.  ? Try different positions with each feeding. If you are having problems, ask for help from your doctor or a lactation consultant. · To get your baby to latch on:  ? Support and narrow your breast with one hand using a \"U hold,\" with your thumb on the outer side of your breast and your fingers on the inner side. You can also use a \"C hold,\" with all your fingers below the nipple and your thumb above it. Try the different holds to get the deepest latch for whichever breastfeeding position you use. Your other arm is behind your baby's back, with your hand supporting the base of the baby's head. Position your fingers and thumb to point toward your baby's ears. ? You can touch your baby's lower lip with your nipple to get your baby to open his or her mouth. Wait until your baby opens up really wide, like a big yawn. Then be sure to bring the baby quickly to your breastnot your breast to the baby. As you bring your baby toward your breast, use your other hand to support the breast and guide it into his or her mouth. ? Both the nipple and a large portion of the darker area around the nipple (areola) should be in the baby's mouth. The baby's lips should be flared outward, not folded in (inverted). ? Listen for a regular sucking and swallowing pattern while the baby is feeding. If you cannot see or hear a swallowing pattern, watch the baby's ears, which will wiggle slightly when the baby swallows. If the baby's nose appears to be blocked by your breast, bring your baby's body closer to you. This will help tilt the baby's head back slightly, so just the edge of one nostril is clear for breathing. ? When your baby is latched, you can usually remove your hand from supporting your breast and bring it under your baby to cradle him or her.  Now just relax and breastfeed your baby. · You will know that your baby is feeding well when:  ? His or her mouth covers a lot of the areola, and the lips are flared out.  ? His or her chin and nose rest against your breast.  ? Sucking is deep and rhythmic, with short pauses. ? You are able to see and hear your baby swallowing. ? You do not feel pain in your nipple. · Offer both breasts to your baby at each feeding. Each time you breastfeed, switch which breast you start with. · Anytime you need to remove your baby from the breast, put one finger in the corner of his or her mouth. Push your finger between your baby's gums to gently break the seal. If you do not break the tight seal before you remove your baby, your nipples can become sore, cracked, or bruised. · After feeding your baby, gently pat his or her back to let out any swallowed air. After your baby burps, offer the breast again, or offer the other breast. Sometimes a baby will want to keep feeding after being burped. When should you call for help? Call your doctor now or seek immediate medical care if:  · You have symptoms of a breast infection, such as:  ? Increased pain, swelling, redness, or warmth around a breast.  ? Red streaks extending from the breast.  ? Pus draining from a breast.  ? A fever. · Your baby has no wet diapers for 6 hours. Watch closely for changes in your health, and be sure to contact your doctor if:  · Your baby has trouble latching on to your breast.  · You continue to have pain or discomfort when breastfeeding. · You have other questions or concerns. Where can you learn more? Go to http://siva-carolyn.info/  Enter P492 in the search box to learn more about \"Breastfeeding: Care Instructions. \"  Current as of: February 11, 2020               Content Version: 12.5  © 5919-0838 [employer]. Care instructions adapted under license by [employer] (which disclaims liability or warranty for this information). If you have questions about a medical condition or this instruction, always ask your healthcare professional. [place] [address] any warranty or liability for your use of this information.

## 2020-07-24 LAB
CYTOLOGIST CVX/VAG CYTO: NORMAL
CYTOLOGY CVX/VAG DOC CYTO: NORMAL
CYTOLOGY CVX/VAG DOC THIN PREP: NORMAL
DX ICD CODE: NORMAL
LABCORP, 190119: NORMAL
Lab: NORMAL
OTHER STN SPEC: NORMAL
STAT OF ADQ CVX/VAG CYTO-IMP: NORMAL

## 2021-01-25 ENCOUNTER — TELEPHONE (OUTPATIENT)
Dept: OBGYN CLINIC | Age: 31
End: 2021-01-25

## 2021-01-25 ENCOUNTER — OFFICE VISIT (OUTPATIENT)
Dept: OBGYN CLINIC | Age: 31
End: 2021-01-25
Payer: COMMERCIAL

## 2021-01-25 VITALS — WEIGHT: 152.5 LBS | BODY MASS INDEX: 26.18 KG/M2 | SYSTOLIC BLOOD PRESSURE: 122 MMHG | DIASTOLIC BLOOD PRESSURE: 80 MMHG

## 2021-01-25 DIAGNOSIS — N81.6 RECTOCELE: Primary | ICD-10-CM

## 2021-01-25 PROCEDURE — 99212 OFFICE O/P EST SF 10 MIN: CPT | Performed by: ADVANCED PRACTICE MIDWIFE

## 2021-01-25 NOTE — TELEPHONE ENCOUNTER
All should be well; just some pelvic relaxation; ok to place w CNM sooner and I'll come in for consult if there's any concerns

## 2021-01-25 NOTE — TELEPHONE ENCOUNTER
Patient of     Calling to say that she is about 7 months post partum. She said that she has had a lot of vaginal aching recently and it worsened over the weekend. She said that she had a bowel movement Friday or Saturday. She said that she felt almost like she had a lost tampon inside even though she has not used one in one month. She said that she has pink tissue protruding from the vagina and it is \"freaking her out\". She is unable to get a sooner appointment with you until 2/17/21 and she does not want to wait that long. She is concerned of safely having intercourse or not as well. Do you want to see her sooner or have her see a midwife sooner?

## 2021-01-25 NOTE — PROGRESS NOTES
Valente Maria is a 26 y.o. female who complains of  Concerns about prolapse. Some heaviness after bowel movement she states \"it felt really weird\". She took a look and she saw extra tissue protruding when she bent over. Her current method of family planning is none. The patient is sexually active. She developed this problem approximately 2 weeks ago, initially notice following a bowel movement. No difficulty stooling or voiding, no intercourse since problem began. Her relevant past medical history:   Past Medical History:   Diagnosis Date    Gestational diabetes         No past surgical history on file. Social History     Occupational History    Not on file   Tobacco Use    Smoking status: Never Smoker    Smokeless tobacco: Never Used   Substance and Sexual Activity    Alcohol use: Not Currently    Drug use: Never    Sexual activity: Yes     Partners: Male     Birth control/protection: None     Family History   Problem Relation Age of Onset    Hypertension Mother     Breast Cancer Paternal Aunt        No Known Allergies  Prior to Admission medications    Medication Sig Start Date End Date Taking? Authorizing Provider   PNV RM.35/TBIMRJA fum/folic ac (PRENATAL PO) Take  by mouth.     Provider, Historical        Review of Systems - History obtained from the patient  Constitutional: negative for weight loss, fever, night sweats  HEENT: negative for hearing loss, earache, congestion, snoring, sorethroat  CV: negative for chest pain, palpitations, edema  Resp: negative for cough, shortness of breath, wheezing  Breast: negative for breast lumps, nipple discharge, galactorrhea  GI: negative for change in bowel habits, abdominal pain, black or bloody stools  : negative for frequency, dysuria, hematuria  MSK: negative for back pain, joint pain, muscle pain  Skin: negative for itching, rash, hives  Neuro: negative for dizziness, headache, confusion, weakness  Psych: negative for anxiety, depression, change in mood  Heme/lymph: negative for bleeding, bruising, pallor      Objective: There were no vitals taken for this visit.        PHYSICAL EXAMINATION    Constitutional  · Appearance: well-nourished, well developed, alert, in no acute distress    HENT  · Head and Face: appears normal    Neck  · Inspection/Palpation: normal appearance, no masses or tenderness  · Lymph Nodes: no lymphadenopathy present  · Thyroid: gland size normal, nontender, no nodules or masses present on palpation  ·   Breasts  · Inspection of Breasts: breasts symmetrical, no skin changes, no discharge present, nipple appearance normal, no skin retraction present  · Palpation of Breasts and Axillae: no masses present on palpation, no breast tenderness  · Axillary Lymph Nodes: no lymphadenopathy present    Gastrointestinal  · Abdominal Examination: abdomen non-tender to palpation, normal bowel sounds, no masses present  · Liver and spleen: no hepatomegaly present, spleen not palpable  · Hernias: no hernias identified    Genitourinary  · External Genitalia: normal appearance for age, no discharge present, no tenderness present, no inflammatory lesions present, no masses present, no atrophy present  · Vagina: normal vaginal vault without central or paravaginal defects, no discharge present, no inflammatory lesions present, no masses present  · Bladder: non-tender to palpation  · Urethra: appears normal  · Cervix: normal   · Uterus: normal size, shape and consistency  · Adnexa: no adnexal tenderness present, no adnexal masses present  · Perineum: perineum within normal limits, no evidence of trauma, no rashes or skin lesions present  · Anus: anus within normal limits, no hemorrhoids present, small rectocele present with bearing down  · Inguinal Lymph Nodes: no lymphadenopathy present    Skin  · General Inspection: no rash, no lesions identified    Neurologic/Psychiatric  · Mental Status:  · Orientation: grossly oriented to person, place and time  · Mood and Affect: mood normal, affect appropriate    Assessment:   Mild rectocele     Plan:   Consulted with Dr. Bhumika Mcgregor; plan for expectant management, pt to begin kegels. Pt will call prn with additional questions/concerns. Patient declines presence of chaperone during today's visit.

## 2021-08-16 ENCOUNTER — TELEPHONE (OUTPATIENT)
Dept: OBGYN CLINIC | Age: 31
End: 2021-08-16

## 2021-08-16 NOTE — TELEPHONE ENCOUNTER
This nurse attempted to reach the patient and left a detailed voice mail message as requested by the patient. Elena Bates

## 2021-08-16 NOTE — TELEPHONE ENCOUNTER
Call received at 9:20am      33year old patient last seen in the office on 7/17/2020 for annual exam     Patient has annual tomorrow.     Patient reports congestion, had sore throat ( not now) no fever and has had covid vaccine    Patient is wondering if she can keep her appointment    Please advise    Thank you

## 2021-08-26 ENCOUNTER — TELEPHONE (OUTPATIENT)
Dept: OBGYN CLINIC | Age: 31
End: 2021-08-26

## 2021-08-26 NOTE — TELEPHONE ENCOUNTER
Call placed @3582    This RN advised pt of MD recommendations. Pt is scheduled for US @3831 and OV with Dr. Zain Rossi @9505. Pt verbalized understanding.

## 2021-08-26 NOTE — TELEPHONE ENCOUNTER
Call received @0408    Pt calling with c/o pelvic pain, bloating and cramping intermittently for about a month or so. Pt states she was dx with a mild prolapse before, but didn't need anything done for it. Pt states she is now feeling cramping more intensely which is now including cramping/pain in her rectum. Pt rates the pain 6/10. PT also mentions she had sexual intercourse last night and afterwards she was in so much pain (cramping) she had to sleep with a heating pad. Pt denies any VB. ? OV add on?    Please advise

## 2021-08-27 ENCOUNTER — OFFICE VISIT (OUTPATIENT)
Dept: OBGYN CLINIC | Age: 31
End: 2021-08-27

## 2021-08-27 VITALS — BODY MASS INDEX: 26.61 KG/M2 | DIASTOLIC BLOOD PRESSURE: 72 MMHG | SYSTOLIC BLOOD PRESSURE: 118 MMHG | WEIGHT: 155 LBS

## 2021-08-27 DIAGNOSIS — R10.2 PELVIC PAIN: ICD-10-CM

## 2021-08-27 DIAGNOSIS — N81.4 UTEROVAGINAL PROLAPSE: Primary | ICD-10-CM

## 2021-08-27 PROCEDURE — 99213 OFFICE O/P EST LOW 20 MIN: CPT | Performed by: OBSTETRICS & GYNECOLOGY

## 2021-08-27 NOTE — PATIENT INSTRUCTIONS
Pelvic Pain: Care Instructions  Your Care Instructions     Pelvic pain, or pain in the lower belly, can have many causes. Often pelvic pain is not serious and gets better in a few days. If your pain continues or gets worse, you may need tests and treatment. Tell your doctor about any new symptoms. These may be signs of a serious problem. Follow-up care is a key part of your treatment and safety. Be sure to make and go to all appointments, and call your doctor if you are having problems. It's also a good idea to know your test results and keep a list of the medicines you take. How can you care for yourself at home? · Rest until you feel better. Lie down, and raise your legs by placing a pillow under your knees. · Drink plenty of fluids. You may find that small, frequent sips are easier on your stomach than if you drink a lot at once. Avoid drinks with carbonation or caffeine, such as soda pop, tea, or coffee. · Try eating several small meals instead of 2 or 3 large ones. Eat mild foods, such as rice, dry toast or crackers, bananas, and applesauce. Avoid fatty and spicy foods, other fruits, and alcohol until 48 hours after your symptoms have gone away. · Take an over-the-counter pain medicine, such as acetaminophen (Tylenol), ibuprofen (Advil, Motrin), or naproxen (Aleve). Read and follow all instructions on the label. · Do not take two or more pain medicines at the same time unless the doctor told you to. Many pain medicines have acetaminophen, which is Tylenol. Too much acetaminophen (Tylenol) can be harmful. · You can put a heating pad, a warm cloth, or moist heat on your belly to relieve pain. When should you call for help? Call your doctor now or seek immediate medical care if:    · You have a new or higher fever.     · You have unusual vaginal bleeding.     · You have new or worse belly or pelvic pain.     · You have vaginal discharge that has increased in amount or smells bad.    Watch closely for changes in your health, and be sure to contact your doctor if:    · You do not get better as expected. Where can you learn more? Go to http://www.gray.com/  Enter B514 in the search box to learn more about \"Pelvic Pain: Care Instructions. \"  Current as of: July 17, 2020               Content Version: 12.8  © 3095-0659 [employer]. Care instructions adapted under license by [employer] (which disclaims liability or warranty for this information). If you have questions about a medical condition or this instruction, always ask your healthcare professional. [place] [address] any warranty or liability for your use of this information.

## 2021-08-27 NOTE — PROGRESS NOTES
Paulette Gan is a 33 y.o. female who complains of  Bloating and discomfort. Pt mentioned 2 nights ago after intercourse , she had pelvic and rectum pain instantly . Interested in getting IUD. Pain now minimal.  Her current method of family planning is pull out method. The patient is sexually active. She developed this problem approximately 2 nights ago. TV ULTRASOUND  THE UTERUS IS ANTEVERTED, NORMAL IN SIZE AND ECHOGENICITY. THE ENDOMETRIUM MEASURES 17.3MM IN THICKNESS. NO MASSES OR ABNORMALITIES ARE SEEN. RIGHT OVARY APPEARS WNL. LEFT OVARY APPEARS TO HAVE A HEMORRHAGIC CYST MEASURING 29 X 25 X 28 MM. NO FREE FLUID IS SEEN IN THE CDS. Her relevant past medical history:   Past Medical History:   Diagnosis Date    Gestational diabetes         No past surgical history on file. Social History     Occupational History    Not on file   Tobacco Use    Smoking status: Never Smoker    Smokeless tobacco: Never Used   Substance and Sexual Activity    Alcohol use: Not Currently    Drug use: Never    Sexual activity: Yes     Partners: Male     Birth control/protection: None     Family History   Problem Relation Age of Onset    Hypertension Mother     Breast Cancer Paternal Aunt        No Known Allergies  Prior to Admission medications    Medication Sig Start Date End Date Taking? Authorizing Provider   PNV OS.40/CDSVVEO fum/folic ac (PRENATAL PO) Take  by mouth.     Provider, Historical        Review of Systems - History obtained from the patient  Constitutional: negative for weight loss, fever, night sweats  HEENT: negative for hearing loss, earache, congestion, snoring, sorethroat  CV: negative for chest pain, palpitations, edema  Resp: negative for cough, shortness of breath, wheezing  Breast: negative for breast lumps, nipple discharge, galactorrhea  GI: negative for change in bowel habits, abdominal pain, black or bloody stools  : negative for frequency, dysuria, hematuria  MSK: negative for back pain, joint pain, muscle pain  Skin: negative for itching, rash, hives  Neuro: negative for dizziness, headache, confusion, weakness  Psych: negative for anxiety, depression, change in mood  Heme/lymph: negative for bleeding, bruising, pallor      Objective: There were no vitals taken for this visit. PHYSICAL EXAMINATION    Constitutional  · Appearance: well-nourished, well developed, alert, in no acute distress    HENT  · Head and Face: appears normal      Gastrointestinal  · Abdominal Examination: abdomen non-tender to palpation, normal bowel sounds, no masses present  · Liver and spleen: no hepatomegaly present, spleen not palpable  · Hernias: no hernias identified    Genitourinary  · External Genitalia: normal appearance for age, no discharge present, no tenderness present, no inflammatory lesions present, no masses present, no atrophy present  · Vagina: 2nd degree rectocele;  no discharge present, no inflammatory lesions present, no masses present  · Bladder: non-tender to palpation  · Urethra: appears normal  · Cervix: normal   · Uterus: normal size, shape and consistency  · Adnexa: no adnexal tenderness present, no adnexal masses present  · Perineum: perineum within normal limits, no evidence of trauma, no rashes or skin lesions present  · Anus: anus within normal limits, no hemorrhoids present  · Inguinal Lymph Nodes: no lymphadenopathy present    Skin  · General Inspection: no rash, no lesions identified    Neurologic/Psychiatric  · Mental Status:  · Orientation: grossly oriented to person, place and time  · Mood and Affect: mood normal, affect appropriate    Assessment:    rectocele  Hemorrhagic corpus luteum- resolving    Plan:   Reviewed US and exam findings  Desires IUD insertion; literature shared  Conservative management fully reviewed and all questions answered. Patient declines presence of chaperone during today's visit.

## 2021-09-13 ENCOUNTER — OFFICE VISIT (OUTPATIENT)
Dept: OBGYN CLINIC | Age: 31
End: 2021-09-13
Payer: COMMERCIAL

## 2021-09-13 DIAGNOSIS — Z01.419 ENCOUNTER FOR ANNUAL ROUTINE GYNECOLOGICAL EXAMINATION: Primary | ICD-10-CM

## 2021-09-13 DIAGNOSIS — Z30.430 ENCOUNTER FOR INSERTION OF MIRENA IUD: ICD-10-CM

## 2021-09-13 LAB
HCG URINE, QL. (POC): NEGATIVE
VALID INTERNAL CONTROL?: YES

## 2021-09-13 PROCEDURE — 99395 PREV VISIT EST AGE 18-39: CPT | Performed by: OBSTETRICS & GYNECOLOGY

## 2021-09-13 PROCEDURE — 58300 INSERT INTRAUTERINE DEVICE: CPT | Performed by: OBSTETRICS & GYNECOLOGY

## 2021-09-13 PROCEDURE — 81025 URINE PREGNANCY TEST: CPT | Performed by: OBSTETRICS & GYNECOLOGY

## 2021-09-13 NOTE — PROGRESS NOTES
Mirena IUD INSERTION  Indications:  Natalia Kawasaki is a ,  33 y.o. female WHITE/NON- No LMP recorded. Her LMP was normal in duration and amount of flow. She  presents for insertion of an IUD. The risks, benefits and alternatives of IUD insertion were discussed in detail at last visit. She also has reviewed appropriate IUD information. She has elected to proceed with the insertion today and she states she has no further questions. A urine pregnancy test was negative No components found for: SPEP, UPEP  Procedure: The pelvic exam revealed normal external genitalia. On bimanual exam the uterus was midposition and normal in size with no tenderness present. A speculum was inserted into the vagina and the cervix was visualized. The cervix was prepped with a betadine solution. The anterior lip of the cervix was grasped with an Allis tenaculum. The uterus was sounded with a Lopez sound to 8centimeters. A Mirena IUD was then inserted without difficulty. The string was cut to 3 centimeters. She experienced a minimal amount of cramping. Post Procedure Status: The patient was observed for 5 minutes after the insertion. There were no complications. Patient was given postop instructions and instructed to check on IUD string in 1 to 2 months or to have IUD check here in the office. Patient was discharged in stable condition.     [facility] AT [address] [place] [address]  OFFICE PROCEDURE PROGRESS NOTE        Chart reviewed for the following:   Di WALLACE, have reviewed the History, Physical and updated the Allergic reactions for Mallorieurvashina Pamt Miles 67. performed immediately prior to start of procedure:   Di WALLACE, have performed the following reviews on Natalia Kawasaki prior to the start of the procedure:            * Patient was identified by name and date of birth   * Agreement on procedure being performed was verified  * Risks and Benefits explained to the patient  * Procedure site verified and marked as necessary  * Patient was positioned for comfort  * Consent was signed and verified     Time: 10am      Date of procedure: 9/13/2021    Procedure performed by:  Rhianna Loaiza MD    Provider assisted by: Mami Greene MA    Patient assisted by: self    How tolerated by patient: tolerated the procedure well with no complications    Post Procedural Pain Scale: 0 - No Hurt    Comments: none

## 2021-09-13 NOTE — PROGRESS NOTES
Annual exam ages 21-44    Nneka Galvez is a ,  33 y.o. female WHITE/NON- No LMP recorded. .    She presents for her annual checkup. She is having pt still having some pain fro last visit  due to cyst found would like to discuss. .  Seen recently with findings of small left hemorrhagic ovarian cyst; pain now resolved but some midcycle cramping over past 2 days    With regard to the Gardasil vaccine, she has not received it yet. Menstrual status:    Her periods are moderate in flow. She is using more than ten pads or tampons per day, usually regular and last 26-30 days. She denies dysmenorrhea. She reports no premenstrual symptoms. Contraception:    The current method of family planning is none. Sexual history:     She  reports being sexually active and has had partner(s) who are Male. She reports using the following method of birth control/protection: None. .    Medical conditions:    Since her last annual GYN exam about one year ago, she has not the following changes in her health history: none. Pap and Mammogram History:    Her most recent Pap smear was normal, obtained 1 year(s) ago. Breast Cancer History/Substance Abuse: negative    Past Medical History:   Diagnosis Date    Gestational diabetes      No past surgical history on file. Current Outpatient Medications   Medication Sig Dispense Refill    PNV LW.32/JVPNXZF fum/folic ac (PRENATAL PO) Take  by mouth. Allergies: Patient has no known allergies. Tobacco History:  reports that she has never smoked. She has never used smokeless tobacco.  Alcohol Abuse:  reports previous alcohol use. Drug Abuse:  reports no history of drug use.     Family Medical/Cancer History:   Family History   Problem Relation Age of Onset    Hypertension Mother     Breast Cancer Paternal Aunt         Review of Systems - History obtained from the patient  Constitutional: negative for weight loss, fever, night sweats  HEENT: negative for hearing loss, earache, congestion, snoring, sorethroat  CV: negative for chest pain, palpitations, edema  Resp: negative for cough, shortness of breath, wheezing  GI: negative for change in bowel habits, abdominal pain, black or bloody stools  : negative for frequency, dysuria, hematuria, vaginal discharge  MSK: negative for back pain, joint pain, muscle pain  Breast: negative for breast lumps, nipple discharge, galactorrhea  Skin :negative for itching, rash, hives  Neuro: negative for dizziness, headache, confusion, weakness  Psych: negative for anxiety, depression, change in mood  Heme/lymph: negative for bleeding, bruising, pallor    Physical Exam    There were no vitals taken for this visit.     Constitutional  · Appearance: well-nourished, well developed, alert, in no acute distress    HENT  · Head and Face: appears normal    Chest  · Respiratory Effort: breathing unlabored    Breasts  · Inspection of Breasts: breasts symmetrical, no skin changes, no discharge present, nipple appearance normal, no skin retraction present  · Palpation of Breasts and Axillae: no masses present on palpation, no breast tenderness  · Axillary Lymph Nodes: no lymphadenopathy present    Gastrointestinal  · Abdominal Examination: abdomen non-tender to palpation, normal bowel sounds, no masses present  · Liver and spleen: no hepatomegaly present, spleen not palpable  · Hernias: no hernias identified    Genitourinary  · External Genitalia: normal appearance for age, no discharge present, no tenderness present, no inflammatory lesions present, no masses present, no atrophy present  · Vagina: normal vaginal vault without central or paravaginal defects, no discharge present, no inflammatory lesions present, no masses present  · Bladder: non-tender to palpation  · Urethra: appears normal  · Cervix: normal   · Uterus: normal size, shape and consistency  · Adnexa: no adnexal tenderness present, no adnexal masses present  · Perineum: perineum within normal limits, no evidence of trauma, no rashes or skin lesions present  · Anus: anus within normal limits, no hemorrhoids present  · Inguinal Lymph Nodes: no lymphadenopathy present    Skin  · General Inspection: no rash, no lesions identified    Neurologic/Psychiatric  · Mental Status:  · Orientation: grossly oriented to person, place and time  · Mood and Affect: mood normal, affect appropriate    . Assessment:  Routine gynecologic examination  IUD placed  Her current medical status is satisfactory with no evidence of significant gynecologic issues. Plan:  GC/ chlamydia offered and declined. IUD placed  Patient declines presence of chaperone during today's visit.    Pap done today  Counseled re: diet, exercise, healthy lifestyle  Return for yearly wellness visits  Gardisil counseling provided  Rec screening mammo at either 33 or 38

## 2021-09-16 LAB
CYTOLOGIST CVX/VAG CYTO: NORMAL
CYTOLOGY CVX/VAG DOC CYTO: NORMAL
CYTOLOGY CVX/VAG DOC THIN PREP: NORMAL
DX ICD CODE: NORMAL
LABCORP, 190119: NORMAL
Lab: NORMAL
Lab: NORMAL
OTHER STN SPEC: NORMAL
STAT OF ADQ CVX/VAG CYTO-IMP: NORMAL

## 2021-12-21 ENCOUNTER — OFFICE VISIT (OUTPATIENT)
Dept: OBGYN CLINIC | Age: 31
End: 2021-12-21

## 2021-12-21 VITALS — WEIGHT: 154 LBS | BODY MASS INDEX: 26.43 KG/M2 | DIASTOLIC BLOOD PRESSURE: 74 MMHG | SYSTOLIC BLOOD PRESSURE: 116 MMHG

## 2021-12-21 DIAGNOSIS — R10.2 PELVIC PAIN IN FEMALE: Primary | ICD-10-CM

## 2021-12-21 PROCEDURE — 99213 OFFICE O/P EST LOW 20 MIN: CPT | Performed by: OBSTETRICS & GYNECOLOGY

## 2021-12-21 NOTE — PROGRESS NOTES
Ophelia Miranda is a 33 y.o. female who complains of pelvic pain described as cramping. She had a Mirena IUD placed in 09/2021. She states the pain feels similar to the time she experienced a hemorrhagic cyst.     Ultrasound today showed: IUD IS SEEN IN THE PROPER POSITION WITHIN THE ENDOMETRIAL CAVITY IN THE UTERINE FUNDUS. THE RIGHT OVARY APPEARS TO HAVE A SIMPLE CYST MEASURING 47 X 24 X 39 MM. Her current method of family planning is IUD. The patient is sexually active. She developed this problem approximately 1 day ago. Her relevant past medical history:   Past Medical History:   Diagnosis Date    Gestational diabetes         No past surgical history on file. Social History     Occupational History    Not on file   Tobacco Use    Smoking status: Never Smoker    Smokeless tobacco: Never Used   Substance and Sexual Activity    Alcohol use: Not Currently    Drug use: Never    Sexual activity: Yes     Partners: Male     Birth control/protection: None     Family History   Problem Relation Age of Onset    Hypertension Mother     Breast Cancer Paternal Aunt        No Known Allergies  Prior to Admission medications    Medication Sig Start Date End Date Taking? Authorizing Provider   PNV AA.68/PLTTFUS fum/folic ac (PRENATAL PO) Take  by mouth.    Yes Provider, Historical        Review of Systems - History obtained from the patient  Constitutional: negative for weight loss, fever, night sweats  HEENT: negative for hearing loss, earache, congestion, snoring, sorethroat  CV: negative for chest pain, palpitations, edema  Resp: negative for cough, shortness of breath, wheezing  Breast: negative for breast lumps, nipple discharge, galactorrhea  GI: negative for change in bowel habits, abdominal pain, black or bloody stools  : negative for frequency, dysuria, hematuria  MSK: negative for back pain, joint pain, muscle pain  Skin: negative for itching, rash, hives  Neuro: negative for dizziness, headache, confusion, weakness  Psych: negative for anxiety, depression, change in mood  Heme/lymph: negative for bleeding, bruising, pallor      Objective:  Visit Vitals  /74   Wt 154 lb (69.9 kg)   BMI 26.43 kg/m²          PHYSICAL EXAMINATION    Constitutional  · Appearance: well-nourished, well developed, alert, in no acute distress    HENT  · Head and Face: appears normal    Gastrointestinal  · Abdominal Examination: abdomen non-tender to palpation, normal bowel sounds, no masses present  · Liver and spleen: no hepatomegaly present, spleen not palpable  · Hernias: no hernias identified       Skin  · General Inspection: no rash, no lesions identified    Neurologic/Psychiatric  · Mental Status:  · Orientation: grossly oriented to person, place and time  · Mood and Affect: mood normal, affect appropriate    Assessment:    right ovarian cyst  IUD in appropriate position    Plan:   Reassurance offered; precautions  Patient declines presence of chaperone during today's visit.

## 2021-12-21 NOTE — PATIENT INSTRUCTIONS
Pelvic Pain: Care Instructions  Your Care Instructions     Pelvic pain, or pain in the lower belly, can have many causes. Often pelvic pain is not serious and gets better in a few days. If your pain continues or gets worse, you may need tests and treatment. Tell your doctor about any new symptoms. These may be signs of a serious problem. Follow-up care is a key part of your treatment and safety. Be sure to make and go to all appointments, and call your doctor if you are having problems. It's also a good idea to know your test results and keep a list of the medicines you take. How can you care for yourself at home? · Rest until you feel better. Lie down, and raise your legs by placing a pillow under your knees. · Drink plenty of fluids. You may find that small, frequent sips are easier on your stomach than if you drink a lot at once. Avoid drinks with carbonation or caffeine, such as soda pop, tea, or coffee. · Try eating several small meals instead of 2 or 3 large ones. Eat mild foods, such as rice, dry toast or crackers, bananas, and applesauce. Avoid fatty and spicy foods, other fruits, and alcohol until 48 hours after your symptoms have gone away. · Take an over-the-counter pain medicine, such as acetaminophen (Tylenol), ibuprofen (Advil, Motrin), or naproxen (Aleve). Read and follow all instructions on the label. · Do not take two or more pain medicines at the same time unless the doctor told you to. Many pain medicines have acetaminophen, which is Tylenol. Too much acetaminophen (Tylenol) can be harmful. · You can put a heating pad, a warm cloth, or moist heat on your belly to relieve pain. When should you call for help? Call 911  anytime you think you may need emergency care. For example, call if:    · You passed out (lost consciousness).    Call your doctor now or seek immediate medical care if:    · You have a new or higher fever.     · You have unusual vaginal bleeding.     · You have new or worse belly or pelvic pain.     · You have vaginal discharge that has increased in amount or smells bad.     · You are dizzy or lightheaded, or you feel like you may faint.     · You have symptoms of sepsis, such as:  ? Shortness of breath. ? Feeling very sick. ? Severe pain. ? A fast heart rate. ? Cool, pale, or clammy skin. ? Feeling confused. ? Feeling very sleepy, or you are hard to wake up. Watch closely for changes in your health, and be sure to contact your doctor if:    · You do not get better as expected. Where can you learn more? Go to http://www.gray.com/  Enter B514 in the search box to learn more about \"Pelvic Pain: Care Instructions. \"  Current as of: February 11, 2021               Content Version: 13.0  © 4770-9411 [employer]. Care instructions adapted under license by [employer] (which disclaims liability or warranty for this information). If you have questions about a medical condition or this instruction, always ask your healthcare professional. [address] any warranty or liability for your use of this information.